# Patient Record
Sex: MALE | Race: BLACK OR AFRICAN AMERICAN | NOT HISPANIC OR LATINO | URBAN - METROPOLITAN AREA
[De-identification: names, ages, dates, MRNs, and addresses within clinical notes are randomized per-mention and may not be internally consistent; named-entity substitution may affect disease eponyms.]

---

## 2019-06-20 ENCOUNTER — HOSPITAL ENCOUNTER (OUTPATIENT)
Dept: PSYCHIATRY | Facility: HOSPITAL | Age: 24
Discharge: HOME | End: 2019-06-20
Attending: PSYCHIATRY & NEUROLOGY
Payer: COMMERCIAL

## 2019-06-20 VITALS
RESPIRATION RATE: 16 BRPM | SYSTOLIC BLOOD PRESSURE: 118 MMHG | BODY MASS INDEX: 26.66 KG/M2 | HEART RATE: 80 BPM | TEMPERATURE: 97 F | HEIGHT: 73 IN | WEIGHT: 201.13 LBS | DIASTOLIC BLOOD PRESSURE: 76 MMHG

## 2019-06-20 DIAGNOSIS — F12.10 CANNABIS ABUSE: ICD-10-CM

## 2019-06-20 DIAGNOSIS — F19.10 SYNTHETIC CANNABINOID ABUSE: Primary | ICD-10-CM

## 2019-06-20 LAB
AMPHET+METHAMPHET UR QL: NEGATIVE
BARBITURATES UR QL SCN>200 NG/ML: NEGATIVE
BENZODIAZ UR QL SCN>200 NG/ML: NEGATIVE
BREATH ALCOHOL: 0
BZE UR QL SCN: NEGATIVE
CANNABINOIDS UR QL SCN: NORMAL
CREAT UR-MCNC: 192 MG/DL (ref 23–375)
ETHANOL UR-MCNC: <10 MG/DL
METHADONE UR QL SCN>300 NG/ML: NEGATIVE
OPIATES UR QL SCN: NEGATIVE
PCP UR QL SCN>25 NG/ML: NEGATIVE
TOXICOLOGY INFORMATION: NORMAL

## 2019-06-20 PROCEDURE — 90853 GROUP PSYCHOTHERAPY: CPT | Mod: ,,, | Performed by: PSYCHOLOGIST

## 2019-06-20 PROCEDURE — 99222 1ST HOSP IP/OBS MODERATE 55: CPT | Mod: ,,, | Performed by: PSYCHIATRY & NEUROLOGY

## 2019-06-20 PROCEDURE — 90853 PR GROUP PSYCHOTHERAPY: ICD-10-PCS | Mod: ,,, | Performed by: PSYCHOLOGIST

## 2019-06-20 PROCEDURE — 30000890 MAYO MISCELLANEOUS TEST (REFLEX)

## 2019-06-20 PROCEDURE — 90853 GROUP PSYCHOTHERAPY: CPT | Performed by: SOCIAL WORKER

## 2019-06-20 PROCEDURE — 80307 DRUG TEST PRSMV CHEM ANLYZR: CPT

## 2019-06-20 PROCEDURE — 80349 CANNABINOIDS NATURAL: CPT

## 2019-06-20 PROCEDURE — 90853 GROUP PSYCHOTHERAPY: CPT

## 2019-06-20 PROCEDURE — 99222 PR INITIAL HOSPITAL CARE,LEVL II: ICD-10-PCS | Mod: ,,, | Performed by: PSYCHIATRY & NEUROLOGY

## 2019-06-20 NOTE — PLAN OF CARE
06/20/19 1000   Activity/Group Therapy Checklist   Group Addiction Education  (family roles )   Attendance Attended   Follows Direction Followed directions   Group Interactions/Observations Interacted appropriately   Affect/Mood Range Normal range   Affect/Mood Display Appropriate

## 2019-06-20 NOTE — PROGRESS NOTES
Group Psychotherapy (PhD/LCSW)    Site: [facility]    Clinical status of patient: Intensive Outpatient Program (IOP)    Date: 6/20/2019    Group Focus: DBT-Based Group Psychotherapy    Length of service: 94020 - 45-50 minutes    Number of patients in attendance: 11    Referred by: Addictive Behavior Unit Treatment Team    Target symptoms: Substance Abuse    Patient's response to treatment: Active Listening and Self-disclosure    Progress toward goals: Progressing adequately    Interval History: Session focus was Emotion Regulation:  Problem-Solving.  Patients were encouraged to identify problems, check the facts, generate solutions and look at pros/cons, and create action steps to use the solution.    Diagnosis: Cannabis use disorder    Plan: Continue treatment on ABU

## 2019-06-20 NOTE — PROGRESS NOTES
Group Psychotherapy (PhD/LCSW)    Site: [facility]    Clinical status of patient: Intensive Outpatient Program (IOP)    Date: 6/20/2019    Group Focus: Psychodynamic Group Psychotherapy    Length of service: 70569 - 45-50 minutes    Number of patients in attendance: 5    Referred by: Addictive Behavior Unit Treatment Team    Target symptoms: Substance Abuse    Patient's response to treatment: Active Listening and Self-disclosure    Progress toward goals: Progressing adequately    Interval History: Shared his story with the group.    Diagnosis: synthetic cannabinoid abuse    Plan: Continue treatment on ABU

## 2019-06-20 NOTE — PLAN OF CARE
06/20/19 1400   Activity/Group Therapy Checklist   Group Addiction Education  (Defense Mechanisms)   Attendance Attended   Follows Direction Followed directions   Group Interactions/Observations Interacted appropriately   Affect/Mood Range Normal range   Affect/Mood Display Appropriate   Goal Progression Progressing

## 2019-06-20 NOTE — H&P
"6/20/2019 9:20 AM  Rosendo Riojas Jr.  1995  [id number]    Addictive Behavior Unit Intensive Outpatient Program Admission History & Physical    STATUS:  Intensive Outpatient Program (IOP)    SUBJECTIVE:   Chief Complaint: "I was using a lot of drugs."    History of Present Illness:   Rosendo Riojas Jr. is a 23 y.o. male with no prior psych history, who presented to ABU IOP due to polysubstance use and recent admission to [facility] for substance induced dino . Per review of [facility] d/c summary, pt presented as manic and grandiose in the setting of recent THC, cocaine, and ecstasy use, cleared quickly during admission and was referred to IOP.     Pt reports that he was at [facility] from doing "stupid things," says he was using drugs and alcohol, cocaine, LSD, alcohol, MDMA, cannabis. Had been feeling sick for about a week, throwing up, not thinking clearly. Hospitalized for a few days, says that he is thinking more clearly, feels a lot better physically.     Reports that he has been using for a few years. Started with weed, then tried LSD. Sometimes xanax, sometimes cocaine. Was selling weed for a while but stopped last year. Notes that substance use has interfered with school work and relationships. Has never tried to stop before, though has tried to cut back unsuccessfully. Never been to treatment.     Marijuana: Had slowed down recently, but had been smoking every day. Started smoking at 18. Feels that it "changed the way [he] viewed things. Got in the way of school/work, affected productivity ,caused conflict. Didn't intentionally smoke synthetics, says that they scared him.     Cocaine: Started a few years ago, initially started as something he would do occasionally, once at a festival, here and there, then became a little more frequent, maybe 1x/month. Never tried crack    LSD: First tried sometime last year or the year before. Began microdosing once he realized he was using too much, would use 1-2x/month. Started " "microdosing last year, would use half a tab. Liked the openness it gave him, but found that when he focused on something bad, it would get worse.     MDMA: Favorite, likes the creativity that he gave him a lot of creativity, would use a couple of times a month, combine with acid.     Opioids: Never tried heroin, tried oxycodone once, made him feel very sick.     Xanax: When other people around him had it, every few months, didn't really like it, made him feel apathetic.     Alcohol: First beer at a party in . Last couple of semesters was drinking pretty heavily, vodka, could go through a handle in a few days-weeks. Beer occasionally, when chilling with friends. ETOH would heighten certain emotions, like anger.     Substance Abuse History:  Substance of Choice: Reports MDMA is favorite, but most frequently uses cannabis  Substances Used: See HPI  History of IVDU?: No  Use of Alcohol: Yes - See HPI. Reports escalating drinking the past year, mainly vodka, sometimes beer.   Average Consumption: "A lot", has a hard time quantifying, though says a handle would last him a couple of weeks.   Last Drink/Use: About a week ago  Tobacco: No  History of Withdrawals: No,   History of Detox: No,   Rehab History: No,   AA/NA: No,   Medication Trials for Substance Abuse: No,   Spouse/Partner Consumption: NA  Patient Aware of Biomedical Complications: Yes    DSM-5 Substance Use Disorder Criteria:  1. Often take in larger amounts or over a longer period of time than was intended: Yes  2. Persistent desire or unsuccessful efforts to cut down or control use:  No  3. Great deal of time spent in activities necessary to obtain substance, use, or recover from effects: Yes  4. Craving/strong desire for substance or urge to use: No  5. Use resulting in failure to fulfill major role obligations at home, work or school: Yes  6. Social, occupational, recreational activities decreased because of use: Yes  7. Continued use despite having " persistent or recurrent social or interpersonal problems cause or exaserbated by the substance: Yes  8. Recurrent use in situations in which it is physically hazardous: Yes  9. Use despite physical or psychological problems that are likely to have been caused or exacerbated by the substance: Yes  10. Tolerance, as defined by either of the following: Yes   A. A need for markedly increased amounts of substance to achieve intoxication or desired effect. -OR-    B. A markedly diminished effect with continued use of the same amount of substance.  11. Withdrawal, as manifested by the following:  No   A. The characteristic withdrawal syndrome for substance. -AND-   B. Substance is taken to relieve or avoid withdrawal symptoms.  Mild (1-3), Moderate (4-5), Severe (?6)      Addictive Behaviors:  Sexual addiction: yes, reports a lot of sex, since getting into college, trouble focusing in school, trouble focusing on reltationships.   Gambling: no  Internet/Social Media/Video Games: no  Shopping: no  Binge eating/Bulimia/Eating Disorders: no  Other behavioral addiction: no    Psychiatric History:  Diagnoses:  Denies.   Previous Medication Trials: no   Previous Psychiatric Hospitalizations: None aside from recent [facility] admission   Previous Suicide Attempts: no   History of Violence: no  Outpatient Psychiatrist: no    Social History:  Marital Status: single  Children: 0   Employment Status: Student  Education: some college, went to [employer] now at [employer] Was initially on medicine track, now wants to do electrical enegineering.   Special Ed: no   History: no  Housing Status: Lives with mom, dad 21 yo brother, 15 yo sister.   Financial Status: Parent support  Leisure/Recreation: Sports/exercise, music,   Childhood History: Reports childhood was good, parents were involved, kept them away from things that wouldn't benefit them.   History of Physical/Sexual Abuse: no  Access to Gun: Dad has a gun, it is locked    Legal  "History:  Past Charges/Incarcerations: no   Pending Charges: no     Family Psychiatric History:   Denies family hx of addiction, depression, bipolar d/o, schizophrenia, etc    Psychiatric Review Of Systems:  sleep: Better. Was having insomnia when using, has improved since hospitalization, getting back on a schedule.   appetite: Improving, less "gluttinous" since not using THC  weight: no  energy/anergy: no  interest/pleasure/anhedonia: no  somatic symptoms: no  guilty/hopelessness: no  concentration: no  S.I.B.s/risky behavior: no  SI/SA:  no    anxiety/panic: yes, reports anxiety when he was selling drugs, worried about getting killed or arrested. Says he doesn't really focus too much on things out of his control, not really a lot of catastrophizing.   Social phobia:  no  OCD:  no  Recurrent nightmares:  yes, Pretty frequently, hard to recall them unless they are vivid. Have improved since he stopped selling drugs.   Other PTSD sx:  no    Irritability: no  Racing thoughts: no  Impulsive behaviors: no  Other hypomanic/manic sx:  no    Paranoia:Mild, some worry about harm related to drug dealing in the past  Delusions: no  AVH:no    Medical Review Of Systems:  Negative     Collateral:   Per [facility] documentation, family reported grandiose, labile, elevated behavior, not sleeping. At baseline works, goes to school, calm.     Allergies:  Patient has no known allergies.  Medical/Surgical History:  Past Medical History:   Diagnosis Date    Cannabis abuse     Synthetic cannabinoid abuse      No past surgical history on file.  OBJECTIVE:     Current Medications:  Infusions:    Scheduled:  Risperidone 0.5mg qhs  PRN:    Home Medications:  Prior to Admission medications    Not on File     Vital Signs:  Vitals:    06/20/19 0855   BP: 118/76   Pulse: 80   Resp: 16   Temp: 97.2 °F (36.2 °C)     Physical Exam:    Head: Normocephalic.   Eyes: Pupils are equal, round, and reactive to light. Conjunctivae and EOM are normal. " "  Cardiovascular: Normal rate and regular rhythm.   Pulmonary/Chest: Effort normal and breath sounds normal. No stridor. No respiratory distress.  Abdominal: Soft. There is no tenderness. There is no guarding.   Musculoskeletal: Normal range of motion. He exhibits no edema.   Neurological: He is alert and oriented to person, place, and time.   Skin: Skin is warm and dry. No rash noted. He is not diaphoretic.       Mental Status Exam:  Appearance: unremarkable, age appropriate, well nourished, casually dressed  Level of Consciousness: Awake, alert  Behavior/Cooperation: normal, cooperative  Psychomotor: unremarkable   Speech: normal tone, normal rate, normal pitch, normal volume  Language: english, fluid  Orientation: grossly intact  Attention Span/Concentration: intact  Memory: Grossly intact  Mood: "pretty good"  Affect: normal and euthymic  Thought Process: linear, normal and logical  Associations: normal and logical  Thought Content: normal, no suicidality, no homicidality, delusions, or paranoia. Possibly mild grandiosity (talks about wanting to be in music business, not sure if this is realistic or not)  Fund of Knowledge: Aware of current events  Abstraction: Grossly intact  Insight: fair  Judgment: fair    Labs/Imaging/Studies:   No results found for this or any previous visit (from the past 24 hour(s)).   ASSESSMENT     Rosendo Riojas Roddy is a 23 y.o. male with no known past psychiatric hx, who presented to ABU IOP following hospitalization at [facility] for dino in the setting of polysubstance use, including cocaine, THC (possibly synthetics), MDMA, LSD. Pt cleared quickly with abstinence and low dose risperidone. Pt reports escalating use over time, interference with school and relationships, use in hazardous situations. No prior addiction treatment, pt presents as motivated to engage in treatment and achieve sobriety at this time, would be at elevated risk of relapse without adequate support and structure. " Still with mild underlying symptoms suggestive of dino (some grandiosity, expansiveness), however significantly improved compared to initial presentation at [facility]. Meets criteria for [facility] level of care.     IMPRESSION  Cannabis Use Disorder Severe  Hallucinogen Use Disorder moderate-severe  Alcohol Use Disorder Moderate  Stimulant Use Disorder Moderate  Substance Induced Mood D/O, r/o bipolar d/o    PLAN     · Start patient on ABU protocol.  · Breathalyzer daily and urine toxicology at least three times per week.  · VS daily x 3 days.  · CBC, CMP, GGT  · Patient counseled on abstinence from drugs, alcohol, and non-prescribed medication    Medications:   · Continue risperidone 0.5mg qhs for now, expect to d/c as symptoms continue to clear.  · Will continue to assess addictive symptoms, including cravings. Can consider naltrexone, acamprosate, or gabapentin for treatment of addiction, though no medications available with evidence base for treatment of cannabis use d/o, stimulant use d/o, or hallucinogen use d/o.     Status: Continue treatment on ABU    This case was discussed with attending psychiatrist MD Raven Ashby MD  [employer] Psychiatry Fellow PGY-7

## 2019-06-21 ENCOUNTER — HOSPITAL ENCOUNTER (OUTPATIENT)
Dept: PSYCHIATRY | Facility: HOSPITAL | Age: 24
Discharge: HOME | End: 2019-06-21
Attending: PSYCHIATRY & NEUROLOGY
Payer: COMMERCIAL

## 2019-06-21 VITALS — DIASTOLIC BLOOD PRESSURE: 77 MMHG | SYSTOLIC BLOOD PRESSURE: 128 MMHG | HEART RATE: 94 BPM | RESPIRATION RATE: 16 BRPM

## 2019-06-21 DIAGNOSIS — F19.10 SYNTHETIC CANNABINOID ABUSE: Primary | ICD-10-CM

## 2019-06-21 DIAGNOSIS — F12.10 CANNABIS ABUSE: ICD-10-CM

## 2019-06-21 LAB
AMPHET+METHAMPHET UR QL: NEGATIVE
BARBITURATES UR QL SCN>200 NG/ML: NEGATIVE
BENZODIAZ UR QL SCN>200 NG/ML: NEGATIVE
BREATH ALCOHOL: 0
BZE UR QL SCN: NEGATIVE
CANNABINOIDS UR QL SCN: NORMAL
CREAT UR-MCNC: 170 MG/DL (ref 23–375)
ETHANOL UR-MCNC: <10 MG/DL
METHADONE UR QL SCN>300 NG/ML: NEGATIVE
OPIATES UR QL SCN: NEGATIVE
PCP UR QL SCN>25 NG/ML: NEGATIVE
TOXICOLOGY INFORMATION: NORMAL

## 2019-06-21 PROCEDURE — 90853 GROUP PSYCHOTHERAPY: CPT | Mod: ,,, | Performed by: PSYCHOLOGIST

## 2019-06-21 PROCEDURE — G0177 OPPS/PHP; TRAIN & EDUC SERV: HCPCS

## 2019-06-21 PROCEDURE — 90853 GROUP PSYCHOTHERAPY: CPT | Performed by: SOCIAL WORKER

## 2019-06-21 PROCEDURE — 90832 PSYTX W PT 30 MINUTES: CPT

## 2019-06-21 PROCEDURE — 90853 PR GROUP PSYCHOTHERAPY: ICD-10-PCS | Mod: ,,, | Performed by: PSYCHOLOGIST

## 2019-06-21 PROCEDURE — 80349 CANNABINOIDS NATURAL: CPT

## 2019-06-21 PROCEDURE — 80307 DRUG TEST PRSMV CHEM ANLYZR: CPT

## 2019-06-21 PROCEDURE — 90853 GROUP PSYCHOTHERAPY: CPT

## 2019-06-21 NOTE — PROGRESS NOTES
Group Psychotherapy (PhD/LCSW)    Site: [facility]    Clinical status of patient: Intensive Outpatient Program (IOP)    Date: 6/21/2019    Group Focus: Stress Management    Length of service: 87330 - 45-50 minutes    Number of patients in attendance: 9    Referred by: Addictive Behavior Unit Treatment Team    Target symptoms: Substance Abuse    Patient's response to treatment: Active Listening    Progress toward goals: Progressing adequately    Interval History: Group learned and practiced mindfulness techniques (progressive muscle relaxation) to improve present-moment awareness, impulsive behavior tendencies, and tolerance of various emotional states.    Diagnosis: Synthetic Cannabinoid Abuse    Plan: Continue treatment on ABU

## 2019-06-21 NOTE — TREATMENT PLAN
[facility]  ADDICTIVE BEHAVIOR UNIT  INTERDISCIPLINARY TREATMENT PLAN  INTENSIVE OUTPATIENT PROGRAM    INTERDISCIPLINARY  TREATMENT TEAM:    Rubin Sainz M.D., Psychiatrist      Mary Delgadillo, Ph.D., Clinical Psychologist    Oumou Kennedy R.N., Registered Nurse    Tobi Murphy, [facility],     Muriel Mcgowan, [facility],     Hai Montoya, [facility],     Resident: Dr. Hansen          Signatures scanned into record separately.      ESTIMATED LOS:  4-6 weeks        The patient has reviewed the treatment plan with staff and has signed the Patient Responsibilities form.  Patient signature scanned into record separately        Dr. Rubin Sainz certifies that the patient would require inpatient psychiatric care if the Partial Hospitalization services were not provided, and services will be furnished under the care of a physician, and under a written Plan of Treatment.    Rubin Sainz M.D., Psychiatrist - Signature scanned into record separately.    TREATMENT PLAN    DIAGNOSIS: Cannabis Use Disorder, severe; Hallucinogen Use Disorder, moderate-severe; Alcohol Use Disorder, moderate; Stimulant Use Disorder, moderate; SIMD      Patient/Family Education Needs/Barriers to Learning (i.e., Language, Reading, Comprehension): None       Support/Advocacy Services/Needs (i.e., Financial, Transportation, Medications): None       Community Resources (i.e., Alcoholics Anonymous, Al Anon, Cocaine Anonymous, Narcotics Anonymous): None           Strengths:  1. Focusing on certain tasks at hand   2. Meeting new people   3. Communication    4. Positive attitude         Limitations:  1. Surroundings   2. Frustration    3. Ease of access   4. Risk for relapse           Goals and Objectives:  1. Goal:  Abstain from alcohol and illicit drugs   Objective measure: Negative breathalyzer, negative urine screens   Time frame to reach goal: By discharge    2  Goal: Attend daily 12-step  meetings   Objective measure: Signed attendance sheet daily   Time frame to reach goal: Each day    3. Goal: Participate in group sessions    Objective measure: Progress notes indicating active listening, self-disclosure,   feedback   Time frame to reach goal: Each day    4. Goal: Obtain a 12-step sponsor   Objective measure: self-report   Time frame to reach goal: By discharge    5. Goal: Complete Life Story   Objective measure: Share story with group   Time frame to reach goal: Within first two weeks of treatment    6. Goal: Complete First Step   Objective measure: Share 1st step with group   Time frame to reach goal:  By discharge    7. Goal: Complete Relapse Prevention Plan   Objective measure: Share plan with group   Time frame to reach goal: By discharge    8.  Goal: Family involvement/participation   Objective measure: Family session documented in progress notes   Time frame to reach goal: By discharge    9. Goal:  Reduce depression   Objective measure: Physician progress note indicating depression is improved   Time frame to reach goal: By discharge    10.  Goal: Reduce anxiety   Objective measure: Physician progress note indicating anxiety is improved   Time frame to reach goal: By discharge                                 Group Interventions:  Psychodynamic Group Psychotherapy  1 hour, five times per week  Goals: 1. Utilize group empathy and support for problem solving; 2. Apply stress management, communication, and assertive skills to personal issues; 3. Discuss negative consequences of addictive behavior; 4. Discuss ways to change lifestyle to support sobriety; 5. Discuss addiction history    Addiction Education Group  1 hour, 2 times per week  Goals:  1. Verbalize increased knowledge of the process of recovery; 2. Understand basic concepts of addiction (denial, powerlessness, unmanageabiltiy, etc.); 3. Develop a consistent, positive image of self    Steps to Recovery Group  1 hour, 1 time per  week  Goals:  1. Learn 12 steps; 2. Identify ways to incorporate 12 step principles into daily life; 3. Complete first step; 4. Verbalize knowledge and understanding of the concept of a higher power    Living Sober Group  1 hour, 2 times per week  Goals:  1.  Reflect upon events of day/weekend, focusing on positive change; 2.  Discuss dynamics of 12 step meetings attended; 3. Discuss topics from book Living Sober     Stress Management Skills Group  1 hour, 3 times per week  Goals: 1. Identify types and levels of stress; 2. Identify and change maladaptive beliefs and behaviors; 3. Identify and practice techniques of stress management    Disease Concept Group  1 hour, 1 time per week  Goals: 1. Verbalize an understanding of the disease concept of addiction; 2. Increase familys understanding of the disease concept of addiction    Communication Skills Group  1 hour, 2 times per week  Goals: 1. Learn rules of effective communication; 2. Improve listening skills; 3. Practice clear communication    Promoting Healthy Lifestyles Group  1 hour, 1 time per week  Goals:  1. Understand the biopsychosocial model of health; 2. Develop insight into how substance abuse/dependency can impact dimensions of health; 3. Develop appropriate health promotion strategies    Relationship Dynamics Group  1 hour, 1 time per week  Goals:  1. Learn about factors that shape relationships; 2. Understand the central role of relationships in personal well-being; 3. Learn how to improve all relationships    Medical Complications Group  1 hour, 1 time per week  Goals:  1.  Increase knowledge of how addiction negatively affects the body; 2. Increase awareness of how abstinence can positively impact health

## 2019-06-21 NOTE — PROGRESS NOTES
Group Psychotherapy (PhD/LCSW)    Site: [facility]    Clinical status of patient: Intensive Outpatient Program (IOP)    Date: 6/21/2019    Group Focus: Psychodynamic Group Psychotherapy    Length of service: 54945 - 45-50 minutes    Number of patients in attendance: 5    Referred by: Addictive Behavior Unit Treatment Team    Target symptoms: Substance Abuse    Patient's response to treatment: Active Listening and Self-disclosure    Progress toward goals: Progressing adequately    Interval History: Pt discussed learning at his first AA meeting that he is not alone and not unique in his addiction.     Diagnosis: synthetic cannabinoid abuse    Plan: Continue treatment on ABU
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
## 2019-06-21 NOTE — PLAN OF CARE
06/21/19 1400   Activity/Group Therapy Checklist   Group Relapse Prevention   Attendance Attended   Follows Direction Followed directions   Group Interactions/Observations Interacted appropriately   Affect/Mood Range Normal range   Affect/Mood Display Appropriate   Goal Progression Progressing

## 2019-06-24 ENCOUNTER — HOSPITAL ENCOUNTER (OUTPATIENT)
Dept: PSYCHIATRY | Facility: HOSPITAL | Age: 24
Discharge: HOME | End: 2019-06-24
Attending: PSYCHIATRY & NEUROLOGY
Payer: COMMERCIAL

## 2019-06-24 VITALS — DIASTOLIC BLOOD PRESSURE: 61 MMHG | HEART RATE: 83 BPM | SYSTOLIC BLOOD PRESSURE: 121 MMHG | RESPIRATION RATE: 16 BRPM

## 2019-06-24 DIAGNOSIS — F12.10 CANNABIS ABUSE: ICD-10-CM

## 2019-06-24 DIAGNOSIS — F19.10 SYNTHETIC CANNABINOID ABUSE: Primary | ICD-10-CM

## 2019-06-24 LAB
AMPHET+METHAMPHET UR QL: NEGATIVE
BARBITURATES UR QL SCN>200 NG/ML: NEGATIVE
BENZODIAZ UR QL SCN>200 NG/ML: NEGATIVE
BREATH ALCOHOL: 0
BZE UR QL SCN: NEGATIVE
CANNABINOIDS UR QL SCN: NORMAL
CREAT UR-MCNC: 129 MG/DL (ref 23–375)
ETHANOL UR-MCNC: <10 MG/DL
METHADONE UR QL SCN>300 NG/ML: NEGATIVE
OPIATES UR QL SCN: NEGATIVE
PCP UR QL SCN>25 NG/ML: NEGATIVE
THC CONFIRMATION CHAIN OF CUSTODY: NORMAL
THC UR-MCNC: 65 NG/ML
TOXICOLOGIST REVIEW: NORMAL
TOXICOLOGY INFORMATION: NORMAL

## 2019-06-24 PROCEDURE — 80307 DRUG TEST PRSMV CHEM ANLYZR: CPT

## 2019-06-24 PROCEDURE — 90853 GROUP PSYCHOTHERAPY: CPT

## 2019-06-24 PROCEDURE — 80349 CANNABINOIDS NATURAL: CPT

## 2019-06-24 PROCEDURE — 90853 GROUP PSYCHOTHERAPY: CPT | Performed by: SOCIAL WORKER

## 2019-06-24 PROCEDURE — 90853 PR GROUP PSYCHOTHERAPY: ICD-10-PCS | Mod: 59,,, | Performed by: PSYCHOLOGIST

## 2019-06-24 PROCEDURE — 90853 GROUP PSYCHOTHERAPY: CPT | Mod: 59,,, | Performed by: PSYCHOLOGIST

## 2019-06-24 NOTE — PLAN OF CARE
06/24/19 1000   Activity/Group Therapy Checklist   Group Educational  (grief/loss )   Attendance Attended   Follows Direction Followed directions   Group Interactions/Observations Interacted appropriately  (laughing to himself )   Affect/Mood Range Normal range   Affect/Mood Display Appropriate

## 2019-06-24 NOTE — PROGRESS NOTES
Group Psychotherapy (PhD/LCSW)    Site: [facility]    Clinical status of patient: Intensive Outpatient Program (IOP)    Date: 6/24/2019    Group Focus: Communication Skills     Length of service: 86849 - 45-50 minutes    Number of patients in attendance: 11    Referred by: Addictive Behavior Unit Treatment Team    Target symptoms: Substance Abuse    Patient's response to treatment: Active Listening      Progress toward goals: Progressing adequately    Interval History:  Discussed how to use I-messages to enhance the communication process. Illustrated their value and their application through modeling; role play with pt's; and vignettes..        Diagnosis: synthetic cannabinoid abuse    Plan: Continue treatment on ABU

## 2019-06-24 NOTE — PROGRESS NOTES
"2019 10:22 AM  Name: Rosendo Riojas Jr.  : 1995  Start Date: 19    ABU Intensive Outpatient Program   Progress Note    Status: Intensive Outpatient Program (IOP)    HPI:  Rosendo Riojas Jr. is a 23 y.o. male with no prior psych history, who presented to ABU IOP due to polysubstance use and recent admission to [facility] for substance induced dino . Per review of [facility] d/c summary, pt presented as manic and grandiose in the setting of recent THC, cocaine, and ecstasy use, cleared quickly during admission and was referred to IOP.      Pt reports that he was at [facility] from doing "stupid things," says he was using drugs and alcohol, cocaine, LSD, alcohol, MDMA, cannabis. Had been feeling sick for about a week, throwing up, not thinking clearly. Hospitalized for a few days, says that he is thinking more clearly, feels a lot better physically.      Reports that he has been using for a few years. Started with weed, then tried LSD. Sometimes xanax, sometimes cocaine. Was selling weed for a while but stopped last year. Notes that substance use has interfered with school work and relationships. Has never tried to stop before, though has tried to cut back unsuccessfully. Never been to treatment.      Marijuana: Had slowed down recently, but had been smoking every day. Started smoking at 18. Feels that it "changed the way [he] viewed things. Got in the way of school/work, affected productivity ,caused conflict. Didn't intentionally smoke synthetics, says that they scared him.      Cocaine: Started a few years ago, initially started as something he would do occasionally, once at a festival, here and there, then became a little more frequent, maybe 1x/month. Never tried crack     LSD: First tried sometime last year or the year before. Began microdosing once he realized he was using too much, would use 1-2x/month. Started microdosing last year, would use half a tab. Liked the openness it gave him, but found that when he " "focused on something bad, it would get worse.      MDMA: Favorite, likes the creativity that he gave him a lot of creativity, would use a couple of times a month, combine with acid.      Opioids: Never tried heroin, tried oxycodone once, made him feel very sick.      Xanax: When other people around him had it, every few months, didn't really like it, made him feel apathetic.      Alcohol: First beer at a party in . Last couple of semesters was drinking pretty heavily, vodka, could go through a handle in a few days-weeks. Beer occasionally, when chilling with friends. ETOH would heighten certain emotions, like anger.        SUBJECTIVE:     Interval Hx:  6/24/19  Pt reports doing pretty well. Had a good weekend, got good sleep. Spent time with family. Denies cravings, said he felt like he got a "natrual high' off doing "normal things" with his family. Sleeping well, going to bed around 10pm. Mood pretty good. Denies use. Going to meetings. Has not started back on risperdal, mom hasn't picked it up, unsure if he needs it.     Toxicology Screen: UDS 6/21 +THC, confirmation pending, Breath ETOH 6/21 Neg   Medication Side Effects: None  Cravings: no  No other acute psychiatric issues reported at this time.    Scheduled Meds:   No current outpatient medications on file prior to encounter.     No current facility-administered medications on file prior to encounter.      Allergies:  Patient has no known allergies.    Psychiatric Review Of Systems:  Denies: Awake, alert  Behavior/Cooperation: normal, cooperative  Psychomotor: unremarkable   Speech: normal tone, normal rate, normal pitch, normal volume  Language: english, fluid  Orientation: grossly intact  Attention Span/Concentration: intact  Memory: Grossly intact  Mood: "Pretty good"  Affect: normal  Thought Process: linear, normal and logical  Associations: normal and logical  Thought Content: normal, no suicidality, no homicidality, delusions, or paranoia  Fund of " Knowledge: Aware of current events  Abstraction: Grossly intact  Insight: fair  Judgment: fair    Laboratory:  Recent Results (from the past 168 hour(s))   Toxicology screen, urine    Collection Time: 06/20/19 11:22 AM   Result Value Ref Range    Alcohol, Urine <10 <10 mg/dL    Benzodiazepines Negative     Methadone metabolites Negative     Cocaine (Metab.) Negative     Opiate Scrn, Ur Negative     Barbiturate Screen, Ur Negative     Amphetamine Screen, Ur Negative     THC Presumptive Positive     Phencyclidine Negative     Creatinine, Random Ur 192.0 23.0 - 375.0 mg/dL    Toxicology Information SEE COMMENT    POCT BREATH ALCOHOL TEST    Collection Time: 06/20/19 11:22 AM   Result Value Ref Range    Breath Alcohol 0.000    Toxicology screen, urine    Collection Time: 06/21/19 10:04 AM   Result Value Ref Range    Alcohol, Urine <10 <10 mg/dL    Benzodiazepines Negative     Methadone metabolites Negative     Cocaine (Metab.) Negative     Opiate Scrn, Ur Negative     Barbiturate Screen, Ur Negative     Amphetamine Screen, Ur Negative     THC Presumptive Positive     Phencyclidine Negative     Creatinine, Random Ur 170.0 23.0 - 375.0 mg/dL    Toxicology Information SEE COMMENT    POCT BREATH ALCOHOL TEST    Collection Time: 06/21/19 10:05 AM   Result Value Ref Range    Breath Alcohol 0.000      ASSESSMENT:     Rosendo Riojas  is a 23 y.o. male with no known past psychiatric hx, who presented to ABU IOP on 6/20/19  following hospitalization at [facility] for dino in the setting of polysubstance use, including cocaine, THC (possibly synthetics), MDMA, LSD. Pt cleared quickly with abstinence and low dose risperidone. Pt reports escalating use over time, interference with school and relationships, use in hazardous situations. No prior addiction treatment, pt presents as motivated to engage in treatment and achieve sobriety at this time, would be at elevated risk of relapse without adequate support and structure. Still with  mild underlying symptoms suggestive of dino (some grandiosity, expansiveness), however significantly improved compared to initial presentation at [facility]. Meets criteria for [facility] level of care.      IMPRESSION  Cannabis Use Disorder Severe  Hallucinogen Use Disorder moderate-severe  Alcohol Use Disorder Moderate  Stimulant Use Disorder Moderate  Substance Induced Mood D/O, r/o bipolar d/o    PLAN:     · Cont patient on ABU protocol.  · Breathalyzer daily and urine toxicology at least three times per week.  · VS daily x 3 days.  · Patient counseled on abstinence from drugs, alcohol, and non-prescribed medication     Medications:   · Continue risperidone 0.5mg qhs for now, expect to d/c as symptoms continue to clear.  · Will continue to assess addictive symptoms, including cravings. Can consider naltrexone, acamprosate, or gabapentin for treatment of addiction, though no medications available with evidence base for treatment of cannabis use d/o, stimulant use d/o, or hallucinogen use d/o.      Status: Continue treatment on ABU     This case was discussed with attending psychiatrist MD Raven Hargrove MD  [employer] Psychiatry Fellow PGY-7

## 2019-06-24 NOTE — PLAN OF CARE
06/24/19 1400   Activity/Group Therapy Checklist   Group Educational  (Gratitude)   Attendance Attended   Follows Direction Followed directions   Group Interactions/Observations Interacted appropriately;Sharing;Alert  (Superficial in responses)   Affect/Mood Range Normal range   Affect/Mood Display Appropriate   Goal Progression Progressing

## 2019-06-24 NOTE — PROGRESS NOTES
Group Psychotherapy (PhD/LCSW)    Site: [facility]    Clinical status of patient: Intensive Outpatient Program (IOP)    Date: 6/24/2019    Group Focus: Psychodynamic Group Psychotherapy    Length of service: 49764 - 45-50 minutes    Number of patients in attendance: 6    Referred by: Addictive Behavior Unit Treatment Team    Target symptoms: Substance Abuse    Patient's response to treatment: Active Listening and Self-disclosure    Progress toward goals: Progressing adequately    Interval History:  Pt shared his story with a new group member.      Diagnosis: synthetic cannabinoid abuse    Plan: Continue treatment on ABU

## 2019-06-25 ENCOUNTER — HOSPITAL ENCOUNTER (OUTPATIENT)
Dept: PSYCHIATRY | Facility: HOSPITAL | Age: 24
Discharge: HOME | End: 2019-06-25
Attending: PSYCHIATRY & NEUROLOGY
Payer: COMMERCIAL

## 2019-06-25 DIAGNOSIS — F19.10 SYNTHETIC CANNABINOID ABUSE: Primary | ICD-10-CM

## 2019-06-25 PROCEDURE — 90853 GROUP PSYCHOTHERAPY: CPT | Performed by: SOCIAL WORKER

## 2019-06-25 PROCEDURE — 90853 PR GROUP PSYCHOTHERAPY: ICD-10-PCS | Mod: ,,, | Performed by: PSYCHOLOGIST

## 2019-06-25 PROCEDURE — 90846 PR FAMILY PSYCHOTHERAPY W/O PT, 50 MIN: ICD-10-PCS | Mod: ,,, | Performed by: PSYCHIATRY & NEUROLOGY

## 2019-06-25 PROCEDURE — 90846 FAMILY PSYTX W/O PT 50 MIN: CPT | Mod: ,,, | Performed by: PSYCHIATRY & NEUROLOGY

## 2019-06-25 PROCEDURE — 90853 GROUP PSYCHOTHERAPY: CPT | Mod: ,,, | Performed by: PSYCHOLOGIST

## 2019-06-25 NOTE — PLAN OF CARE
06/25/19 1400   Activity/Group Therapy Checklist   Group Relapse Prevention   Attendance Attended   Follows Direction Followed directions   Group Interactions/Observations Interacted appropriately   Affect/Mood Range Normal range   Affect/Mood Display Appropriate   Goal Progression Progressing

## 2019-06-25 NOTE — PROGRESS NOTES
Group Psychotherapy (PhD/LCSW)    Site: [facility]    Clinical status of patient: Intensive Outpatient Program (IOP)    Date: 6/25/2019    Group Focus: DBT-Based Group Psychotherapy    Length of service: 37302 - 45-50 minutes    Number of patients in attendance: 11    Referred by: Addictive Behavior Unit Treatment Team    Target symptoms: Substance Abuse    Patient's response to treatment: Active Listening and Self-disclosure    Progress toward goals: Progressing adequately    Interval History: Session focus was Distress Tolerance:  STOP and Self-Soothe.  Patients were encouraged to use crisis survival skills to reduce intensity of distress.  Each patient identified something soothing for all five senses.    Diagnosis: Synthetic cannabinoid abuse    Plan: Continue treatment on ABU

## 2019-06-25 NOTE — PROGRESS NOTES
Group Psychotherapy (PhD/LCSW)    Site: [facility]    Clinical status of patient: Intensive Outpatient Program (IOP)    Date: 6/25/2019    Group Focus: Psychodynamic Group Psychotherapy    Length of service: 56260 - 45-50 minutes    Number of patients in attendance: 7    Referred by: Addictive Behavior Unit Treatment Team    Target symptoms: Substance Abuse    Patient's response to treatment: Active Listening and Self-disclosure    Progress toward goals: Progressing adequately    Interval History: Shared his story with new group member.    Diagnosis: synthetic cannabinoid abuse    Plan: Continue treatment on ABU

## 2019-06-25 NOTE — PROGRESS NOTES
"Family Meeting Note    Met with Rosendo's mother and aunt. They report that Rosendo is doing "much better" than prior to his hospitalization at [facility]. Both described him as grandiose, speaking rapidly, delusional, prior to hospital stay. Mom reports that a couple of weeks prior to inpatient stay, he had gotten a physical illness (vomitting, loss of appetite) that she now believes is related to his drug use. Says he started feeling better, and going out, would come home at 1-2am, be very loud, which is unlike him. Mom and aunt say that, at baseline, he is mild-mannered, low-key, polite. Mom had no concerns about him prior to his becoming ill a couple weeks ago.     Discussed process of program and expectations. Currently oRsendo does not have access to car or cell phone, mom plans to gradually increase privleges as he gets time away from drugs. Discussed positive lifestyle changes, and risk of cannabinoid use in the developing brain.     Plan:  - Continue ABU, monitor symptoms      Raven Varela MD  \[facility]\"" Addiction Psychiatry Fellow PGY-7      Staff note : I, Rubin Sainz MD have personally seen and met with Dr Varela and Rosendo's mother and aunt.   "

## 2019-06-25 NOTE — PROGRESS NOTES
Group Psychotherapy (PhD/LCSW)    Site: [facility]    Clinical status of patient: Intensive Outpatient Program (IOP)    Date: 6/25/2019    Group Focus: Disease Model of Addiction      Length of service: 70077 - 45-50 minutes    Number of patients in attendance: 7    Referred by: Addictive Behavior Unit Treatment Team    Target symptoms: Substance Abuse    Patient's response to treatment: Active Listening      Progress toward goals: Progressing adequately    Interval History: Discussed the basic neuropsychological concepts of the Disease Model of Addiction and their relevance to the phenomena of addiction (eg, powerlessness; cravings; euphoric recall; tolerance; etc). Noted the way the Disease Model comports with 12-step practices and the value of understanding the Disease Model for sustaining long-term sobriety.    Diagnosis: Synthetic cannabinoid abuse    Plan: Continue treatment on ABU

## 2019-06-26 ENCOUNTER — HOSPITAL ENCOUNTER (OUTPATIENT)
Dept: PSYCHIATRY | Facility: HOSPITAL | Age: 24
Discharge: HOME | End: 2019-06-26
Attending: PSYCHIATRY & NEUROLOGY
Payer: COMMERCIAL

## 2019-06-26 VITALS — HEART RATE: 93 BPM | RESPIRATION RATE: 16 BRPM | SYSTOLIC BLOOD PRESSURE: 136 MMHG | DIASTOLIC BLOOD PRESSURE: 85 MMHG

## 2019-06-26 DIAGNOSIS — F19.10 SYNTHETIC CANNABINOID ABUSE: Primary | ICD-10-CM

## 2019-06-26 DIAGNOSIS — F12.10 CANNABIS ABUSE: ICD-10-CM

## 2019-06-26 LAB
BREATH ALCOHOL: 0
ETHYL GLUCURONIDE: NEGATIVE

## 2019-06-26 PROCEDURE — 90853 PR GROUP PSYCHOTHERAPY: ICD-10-PCS | Mod: ,,, | Performed by: PSYCHOLOGIST

## 2019-06-26 PROCEDURE — 90853 GROUP PSYCHOTHERAPY: CPT | Mod: ,,, | Performed by: PSYCHOLOGIST

## 2019-06-26 PROCEDURE — 99232 SBSQ HOSP IP/OBS MODERATE 35: CPT | Mod: ,,, | Performed by: PSYCHIATRY & NEUROLOGY

## 2019-06-26 PROCEDURE — 99232 PR SUBSEQUENT HOSPITAL CARE,LEVL II: ICD-10-PCS | Mod: ,,, | Performed by: PSYCHIATRY & NEUROLOGY

## 2019-06-26 PROCEDURE — 90853 GROUP PSYCHOTHERAPY: CPT | Performed by: SOCIAL WORKER

## 2019-06-26 PROCEDURE — 80307 DRUG TEST PRSMV CHEM ANLYZR: CPT

## 2019-06-26 PROCEDURE — 90853 GROUP PSYCHOTHERAPY: CPT

## 2019-06-26 NOTE — PROGRESS NOTES
Group Psychotherapy (PhD/LCSW)    Site: [facility]    Clinical status of patient: Intensive Outpatient Program (IOP)    Date: 6/26/2019    Group Focus: DBT-Based Group Psychotherapy    Length of service: 54575 - 45-50 minutes    Number of patients in attendance: 10    Referred by: Addictive Behavior Unit Treatment Team    Target symptoms: Substance Abuse    Patient's response to treatment: Active Listening and Self-disclosure    Progress toward goals: Progressing adequately    Interval History: Session focus was Distress Tolerance:  TIP skill.  Patients were encouraged to use temperature, intense exercise, paced breathing, or paired muscle relaxation to reduce intensity of distress.    Diagnosis: Synthetic cannabinoid abuse    Plan: Continue treatment on ABU

## 2019-06-26 NOTE — PLAN OF CARE
06/26/19 1400   Activity/Group Therapy Checklist   Group Music   Attendance Attended   Follows Direction Followed directions   Group Interactions/Observations Interacted appropriately   Affect/Mood Range Normal range   Affect/Mood Display Appropriate   Goal Progression Progressing

## 2019-06-26 NOTE — PROGRESS NOTES
Group Psychotherapy (PhD/LCSW)    Site: [facility]    Clinical status of patient: Intensive Outpatient Program (IOP)    Date: 6/26/2019    Group Focus: Psychodynamic Group Psychotherapy    Length of service: 74056 - 45-50 minutes    Number of patients in attendance: 7    Referred by: Addictive Behavior Unit Treatment Team    Target symptoms: Substance Abuse    Patient's response to treatment: Active Listening and Self-disclosure    Progress toward goals: Progressing adequately    Interval History: reports he is always tired despite sleeping soundly at night, napping during the day, and exercising daily.    Diagnosis: synthetic cannabinoid abuse    Plan: Continue treatment on ABU

## 2019-06-26 NOTE — PROGRESS NOTES
"2019 10:22 AM  Name: Rosendo Riojas Jr.  : 1995  Start Date: 19    ABU Intensive Outpatient Program   Progress Note    Status: Intensive Outpatient Program (IOP)    HPI:  Rosendo Riojas Jr. is a 23 y.o. male with no prior psych history, who presented to ABU IOP due to polysubstance use and recent admission to [facility] for substance induced dino . Per review of [facility] d/c summary, pt presented as manic and grandiose in the setting of recent THC, cocaine, and ecstasy use, cleared quickly during admission and was referred to IOP.      Pt reports that he was at [facility] from doing "stupid things," says he was using drugs and alcohol, cocaine, LSD, alcohol, MDMA, cannabis. Had been feeling sick for about a week, throwing up, not thinking clearly. Hospitalized for a few days, says that he is thinking more clearly, feels a lot better physically.      Reports that he has been using for a few years. Started with weed, then tried LSD. Sometimes xanax, sometimes cocaine. Was selling weed for a while but stopped last year. Notes that substance use has interfered with school work and relationships. Has never tried to stop before, though has tried to cut back unsuccessfully. Never been to treatment.      Marijuana: Had slowed down recently, but had been smoking every day. Started smoking at 18. Feels that it "changed the way [he] viewed things. Got in the way of school/work, affected productivity ,caused conflict. Didn't intentionally smoke synthetics, says that they scared him.      Cocaine: Started a few years ago, initially started as something he would do occasionally, once at a festival, here and there, then became a little more frequent, maybe 1x/month. Never tried crack     LSD: First tried sometime last year or the year before. Began microdosing once he realized he was using too much, would use 1-2x/month. Started microdosing last year, would use half a tab. Liked the openness it gave him, but found that when he " "focused on something bad, it would get worse.      MDMA: Favorite, likes the creativity that he gave him a lot of creativity, would use a couple of times a month, combine with acid.      Opioids: Never tried heroin, tried oxycodone once, made him feel very sick.      Xanax: When other people around him had it, every few months, didn't really like it, made him feel apathetic.      Alcohol: First beer at a party in . Last couple of semesters was drinking pretty heavily, vodka, could go through a handle in a few days-weeks. Beer occasionally, when chilling with friends. ETOH would heighten certain emotions, like anger.        SUBJECTIVE:     Interval Hx:  6/24/19  Pt reports doing pretty well. Had a good weekend, got good sleep. Spent time with family. Denies cravings, said he felt like he got a "natrual high' off doing "normal things" with his family. Sleeping well, going to bed around 10pm. Mood pretty good. Denies use. Going to meetings. Has not started back on risperdal, mom hasn't picked it up, unsure if he needs it.     Toxicology Screen: UDS 6/21 +THC, confirmation pending, Breath ETOH 6/21 Neg   Medication Side Effects: None  Cravings: no  No other acute psychiatric issues reported at this time.    6/26/19  Pt reports doing pretty well. Mood has been good, does feel bored at times which will lead to feeling frustrated, reports that when he feels this way, will work on art, listen to music, exercise, and that these things help him a lot. Groups have been helpful, does express concern about another peer who has been bringing up self-injury in groups, Ro finds this distressing. Otherwise finds program helpful.  [place] [facility] [place]. Denies use or cravings. Reports thinking feels clear again. Sleeping well, bed around 11-12, sleeps through the night. Eating well. No SI. Did not restart risperdal since hospitalization, not feeling he needs it (consistent with family report yesterday). " "No other concerns.     Toxicology Screen: UDS 6/24 +THC, 6/21 + THC confirmation pending, 6/20 + THC  Level 64 Breath ETOH 6/26, 6/24 Neg, ETOH Biomarkers 6/24: Neg  Medication Side Effects: None  Cravings: no  No other acute psychiatric issues reported at this time.    Scheduled Meds:   No current outpatient medications on file prior to encounter.     No current facility-administered medications on file prior to encounter.      Allergies:  Patient has no known allergies.    Psychiatric Review Of Systems:  Denies: Awake, alert  Behavior/Cooperation: normal, cooperative  Psychomotor: unremarkable   Speech: normal tone, normal rate, normal pitch, normal volume  Language: english, fluid  Orientation: grossly intact  Attention Span/Concentration: intact  Memory: Grossly intact  Mood: "Pretty good"  Affect: normal, calm  Thought Process: linear, normal and logical  Associations: normal and logical  Thought Content: normal, no suicidality, no homicidality, delusions, or paranoia  Fund of Knowledge: Aware of current events  Abstraction: Grossly intact  Insight: fair  Judgment: Intact    Laboratory:  Recent Results (from the past 168 hour(s))   Toxicology screen, urine    Collection Time: 06/20/19 11:22 AM   Result Value Ref Range    Alcohol, Urine <10 <10 mg/dL    Benzodiazepines Negative     Methadone metabolites Negative     Cocaine (Metab.) Negative     Opiate Scrn, Ur Negative     Barbiturate Screen, Ur Negative     Amphetamine Screen, Ur Negative     THC Presumptive Positive     Phencyclidine Negative     Creatinine, Random Ur 192.0 23.0 - 375.0 mg/dL    Toxicology Information SEE COMMENT    POCT BREATH ALCOHOL TEST    Collection Time: 06/20/19 11:22 AM   Result Value Ref Range    Breath Alcohol 0.000    THC , urine, confirmation    Collection Time: 06/20/19  3:53 PM   Result Value Ref Range    THC Conf Chain of Custody Test Not Performed     THC GC/MS Conf 65 Cutoff: 3.0 ng/mL    THC Conf. Interp. Positive.  "   Toxicology screen, urine    Collection Time: 06/21/19 10:04 AM   Result Value Ref Range    Alcohol, Urine <10 <10 mg/dL    Benzodiazepines Negative     Methadone metabolites Negative     Cocaine (Metab.) Negative     Opiate Scrn, Ur Negative     Barbiturate Screen, Ur Negative     Amphetamine Screen, Ur Negative     THC Presumptive Positive     Phencyclidine Negative     Creatinine, Random Ur 170.0 23.0 - 375.0 mg/dL    Toxicology Information SEE COMMENT    POCT BREATH ALCOHOL TEST    Collection Time: 06/21/19 10:05 AM   Result Value Ref Range    Breath Alcohol 0.000    Toxicology screen, urine    Collection Time: 06/24/19 11:48 AM   Result Value Ref Range    Alcohol, Urine <10 <10 mg/dL    Benzodiazepines Negative     Methadone metabolites Negative     Cocaine (Metab.) Negative     Opiate Scrn, Ur Negative     Barbiturate Screen, Ur Negative     Amphetamine Screen, Ur Negative     THC Presumptive Positive     Phencyclidine Negative     Creatinine, Random Ur 129.0 23.0 - 375.0 mg/dL    Toxicology Information SEE COMMENT    Alcohol Biomarkers, urine    Collection Time: 06/24/19 11:49 AM   Result Value Ref Range    Ethyl Glucuronide NEGATIVE    POCT BREATH ALCOHOL TEST    Collection Time: 06/24/19 11:50 AM   Result Value Ref Range    Breath Alcohol 0.000    POCT BREATH ALCOHOL TEST    Collection Time: 06/26/19 10:17 AM   Result Value Ref Range    Breath Alcohol 0.000      ASSESSMENT:     Rosendo Riojas  is a 23 y.o. male with no known past psychiatric hx, who presented to ABU IOP on 6/20/19  following hospitalization at [facility] for dino in the setting of polysubstance use, including cocaine, THC (possibly synthetics), MDMA, LSD. Pt cleared quickly with abstinence and low dose risperidone. Pt reports escalating use over time, interference with school and relationships, use in hazardous situations. No prior addiction treatment, pt presents as motivated to engage in treatment and achieve sobriety at this time, would  be at elevated risk of relapse without adequate support and structure. No longer on antipsychotic, presenting without symptoms of psychosis or dino, per family appears to be at baseline. Meets criteria for IOP level of care.      IMPRESSION  Cannabis Use Disorder Severe  Hallucinogen Use Disorder moderate-severe  Alcohol Use Disorder Moderate  Stimulant Use Disorder Moderate  Substance Induced Mood D/O, r/o bipolar d/o    PLAN:     · Cont patient on ABU protocol.  · Breathalyzer daily and urine toxicology at least three times per week. Follow THC levels.   · VS daily x 3 days.  · Patient counseled on abstinence from drugs, alcohol, and non-prescribed medication     Medications:   · Will continue to assess addictive symptoms, including cravings. Can consider naltrexone, acamprosate, or gabapentin for treatment of addiction, though no medications available with evidence base for treatment of cannabis use d/o, stimulant use d/o, or hallucinogen use d/o.   · Hold risperdal, patient has not taken since d/c from [facility]. No overt evidence of psychotic or manic symptoms with abstinence from substances, though will monitor closely and can restart if needed, pt has prescription.      Status: Continue treatment on ABU     This case was discussed with attending psychiatrist MD Raven Castano MD  [employer] Psychiatry Fellow PGY-7      Staff note : I, Rubin Sainz MD have personally seen and evaluated the patient , and reviewed the above history and exam. I agree and concur with the current assessment and plan.

## 2019-06-26 NOTE — PROGRESS NOTES
"Group Psychotherapy (PhD/LCSW)    Site: [facility]    Clinical status of patient: Intensive Outpatient Program (IOP)    Date: 6/26/2019    Group Focus: The Dynamics of Relationship       Length of service: 30244 - 45-50 minutes    Number of patients in attendance: 12    Referred by: Addictive Behavior Unit Treatment Team    Target symptoms: Substance Abuse    Patient's response to treatment: Active Listening      Progress toward goals: Progressing adequately    Interval History: Discussed strategies for ameliorating dysfunctional relationship dynamics by changing one's own part in the pattern rather than focusing on trying to get the other person to change their behavior. Illustrated the way the "butterfly effect" and the "ripple effect" apply to interpersonal relationship dynamics.      Diagnosis: Synthetic cannabinoid abuse    Plan: Continue treatment on ABU        "

## 2019-06-27 ENCOUNTER — HOSPITAL ENCOUNTER (OUTPATIENT)
Dept: PSYCHIATRY | Facility: HOSPITAL | Age: 24
Discharge: HOME | End: 2019-06-27
Attending: PSYCHIATRY & NEUROLOGY
Payer: COMMERCIAL

## 2019-06-27 DIAGNOSIS — F12.10 CANNABIS ABUSE: ICD-10-CM

## 2019-06-27 DIAGNOSIS — F19.10 SYNTHETIC CANNABINOID ABUSE: Primary | ICD-10-CM

## 2019-06-27 LAB
AMPHET+METHAMPHET UR QL: NEGATIVE
BARBITURATES UR QL SCN>200 NG/ML: NEGATIVE
BENZODIAZ UR QL SCN>200 NG/ML: NEGATIVE
BREATH ALCOHOL: 0
BZE UR QL SCN: NEGATIVE
CANNABINOIDS UR QL SCN: NORMAL
CREAT UR-MCNC: 235 MG/DL (ref 23–375)
ETHANOL UR-MCNC: <10 MG/DL
METHADONE UR QL SCN>300 NG/ML: NEGATIVE
OPIATES UR QL SCN: NEGATIVE
PCP UR QL SCN>25 NG/ML: NEGATIVE
THC CONFIRMATION CHAIN OF CUSTODY: NORMAL
THC UR-MCNC: 50 NG/ML
TOXICOLOGIST REVIEW: NORMAL
TOXICOLOGY INFORMATION: NORMAL

## 2019-06-27 PROCEDURE — 90853 GROUP PSYCHOTHERAPY: CPT | Performed by: SOCIAL WORKER

## 2019-06-27 PROCEDURE — 90853 PR GROUP PSYCHOTHERAPY: ICD-10-PCS | Mod: ,,, | Performed by: PSYCHOLOGIST

## 2019-06-27 PROCEDURE — 90853 GROUP PSYCHOTHERAPY: CPT | Mod: ,,, | Performed by: PSYCHOLOGIST

## 2019-06-27 PROCEDURE — 80349 CANNABINOIDS NATURAL: CPT

## 2019-06-27 PROCEDURE — 90853 GROUP PSYCHOTHERAPY: CPT

## 2019-06-27 NOTE — PLAN OF CARE
06/27/19 1400   Activity/Group Therapy Checklist   Group Relapse Prevention  (Step Work )   Attendance Attended   Follows Direction Followed directions   Group Interactions/Observations Interacted appropriately   Affect/Mood Range Normal range   Affect/Mood Display Appropriate   Goal Progression Progressing

## 2019-06-27 NOTE — PROGRESS NOTES
"Group Psychotherapy (PhD/LCSW)    Site: [facility]    Clinical status of patient: Intensive Outpatient Program (IOP)    Date: 6/27/2019    Group Focus: DBT - Distress Tolerance    Length of service: 42077 - 45-50 minutes    Number of patients in attendance: 12    Referred by: Addictive Behavior Unit Treatment Team    Target symptoms: Substance Abuse    Patient's response to treatment: Active Listening    Progress toward goals: Progressing adequately    Interval History: Group learned about ACCEPTS and IMPROVE skills for tolerating distress. They were encouraged to create their own "emergency coping plan" using these and other distress tolerance skills.    Diagnosis: Synthetic Cannabinoid Abuse    Plan: Continue treatment on ABU        "

## 2019-06-27 NOTE — PROGRESS NOTES
Group Psychotherapy (PhD/LCSW)    Site: [facility]    Clinical status of patient: Intensive Outpatient Program (IOP)    Date: 6/27/2019    Group Focus: Psychodynamic Group Psychotherapy    Length of service: 38932 - 45-50 minutes    Number of patients in attendance: 7    Referred by: Addictive Behavior Unit Treatment Team    Target symptoms: Substance Abuse    Patient's response to treatment: Active Listening and Self-disclosure    Progress toward goals: Progressing adequately    Interval History: Discussed the importance of patience in the recovery process and strategies for cultivating same. Pt described himself as an impatient person.    Diagnosis: synthetic cannabinoid abuse    Plan: Continue treatment on ABU

## 2019-06-27 NOTE — PATIENT CARE CONFERENCE
ABU Staffing:    Cannabis Use Disorder Severe  Hallucinogen Use Disorder moderate-severe  Alcohol Use Disorder Moderate  Stimulant Use Disorder Moderate  Substance Induced Mood D/O, r/o bipolar d/o              1. Pt is attending all groups    2. Pt is attending all meetings  3. Pt 's has minimally supportive family  4. Pt has completed spiritual assessment    5. Pt will present life story    6. Pt will present Step One assignment    7. Pt is exploring issues related to relapse  prevention; spirituality; stress management; improved communication skills; assertiveness training; poor self-esteem; disease concepts; cross addictions; and, work related issues    8. D/C date: TBD     Staff discussed pt enrolling in ABU due to polysubstance use. Staffing discussed pt's mother and aunt attending family day and appearing supportive of pt. Staffing discussed pt's positive attitude towards recovery and engaging in program and AA appropriately. Staffing discussed pt discontinuing off of Risperdal.       Problem: Cannabis Use Disorder Severe  Goal: Address in 12 step meetings and group and individual sessions    Objective Measure: participation in groups, self report, length of sobriety, and relapse prevention plan  Time: Prior to discharge    Progress: Pt is attending groups and sessions       Problem: Hallucinogen Use Disorder moderate-severe  Goal: Address in 12 step meetings and group and individual sessions    Objective Measure: participation in groups, self report, length of sobriety, and relapse prevention plan  Time: Prior to discharge    Progress: Pt is attending groups and sessions       Problem: Alcohol Use Disorder Moderate  Goal: Address in 12 step meetings and group and individual sessions    Objective Measure: participation in groups, self report, length of sobriety, and relapse prevention plan  Time: Prior to discharge    Progress: Pt is attending groups and sessions       Problem: Stimulant Use Disorder  Moderate  Goal: Address in 12 step meetings and group and individual sessions    Objective Measure: participation in groups, self report, length of sobriety, and relapse prevention plan  Time: Prior to discharge    Progress: Pt is attending groups and sessions       Problem: Substance Induced Mood D/O, r/o bipolar d/o  Goal: Address in 12 step meetings and group and individual sessions    Objective Measure: participation in groups, self report, length of sobriety, and relapse prevention plan  Time: Prior to discharge    Progress: Pt is attending groups and sessions          Staff members present:   Dr. Varela, fellow  Dr. Jade MD Resident  Muriel Mcgowan, [facility]  Oumou Kennedy RN

## 2019-06-27 NOTE — PLAN OF CARE
06/27/19 1000   Activity/Group Therapy Checklist   Group Addiction Education  (codependency )   Attendance Attended   Follows Direction Followed directions   Group Interactions/Observations Interacted appropriately   Affect/Mood Range Normal range   Affect/Mood Display Appropriate   Goal Progression Progressing

## 2019-06-28 ENCOUNTER — HOSPITAL ENCOUNTER (OUTPATIENT)
Dept: PSYCHIATRY | Facility: HOSPITAL | Age: 24
Discharge: HOME | End: 2019-06-28
Attending: PSYCHIATRY & NEUROLOGY
Payer: COMMERCIAL

## 2019-06-28 VITALS — HEART RATE: 83 BPM | RESPIRATION RATE: 16 BRPM | SYSTOLIC BLOOD PRESSURE: 112 MMHG | DIASTOLIC BLOOD PRESSURE: 69 MMHG

## 2019-06-28 DIAGNOSIS — F19.10 SYNTHETIC CANNABINOID ABUSE: Primary | ICD-10-CM

## 2019-06-28 DIAGNOSIS — F12.10 CANNABIS ABUSE: ICD-10-CM

## 2019-06-28 LAB
AMPHET+METHAMPHET UR QL: NEGATIVE
BARBITURATES UR QL SCN>200 NG/ML: NEGATIVE
BENZODIAZ UR QL SCN>200 NG/ML: NEGATIVE
BREATH ALCOHOL: 0
BZE UR QL SCN: NEGATIVE
CANNABINOIDS UR QL SCN: NORMAL
CREAT UR-MCNC: 137 MG/DL (ref 23–375)
ETHANOL UR-MCNC: <10 MG/DL
METHADONE UR QL SCN>300 NG/ML: NEGATIVE
OPIATES UR QL SCN: NEGATIVE
PCP UR QL SCN>25 NG/ML: NEGATIVE
THC CONFIRMATION CHAIN OF CUSTODY: NORMAL
THC UR-MCNC: NORMAL NG/ML
TOXICOLOGIST REVIEW: NORMAL
TOXICOLOGY INFORMATION: NORMAL

## 2019-06-28 PROCEDURE — 80307 DRUG TEST PRSMV CHEM ANLYZR: CPT

## 2019-06-28 PROCEDURE — 99232 PR SUBSEQUENT HOSPITAL CARE,LEVL II: ICD-10-PCS | Mod: ,,, | Performed by: PSYCHIATRY & NEUROLOGY

## 2019-06-28 PROCEDURE — 90853 PR GROUP PSYCHOTHERAPY: ICD-10-PCS | Mod: ,,, | Performed by: PSYCHOLOGIST

## 2019-06-28 PROCEDURE — 90853 GROUP PSYCHOTHERAPY: CPT

## 2019-06-28 PROCEDURE — 90853 GROUP PSYCHOTHERAPY: CPT | Mod: ,,, | Performed by: PSYCHOLOGIST

## 2019-06-28 PROCEDURE — 80349 CANNABINOIDS NATURAL: CPT

## 2019-06-28 PROCEDURE — 99232 SBSQ HOSP IP/OBS MODERATE 35: CPT | Mod: ,,, | Performed by: PSYCHIATRY & NEUROLOGY

## 2019-06-28 PROCEDURE — 90853 GROUP PSYCHOTHERAPY: CPT | Performed by: SOCIAL WORKER

## 2019-06-28 NOTE — PLAN OF CARE
06/28/19 1000   Activity/Group Therapy Checklist   Group Goals/Reflection   Attendance Attended   Follows Direction Followed directions   Group Interactions/Observations Interacted appropriately   Affect/Mood Range Normal range   Affect/Mood Display Appropriate   Goal Progression Progressing

## 2019-06-28 NOTE — PROGRESS NOTES
"2019 10:22 AM  Name: Rosendo Riojas Jr.  : 1995  Start Date: 19    [facility]U Intensive Outpatient Program   Progress Note    Status: Intensive Outpatient Program (IOP)    HPI:  Rosendo Riojas Jr. is a 23 y.o. male with no prior psych history, who presented to ABU IOP due to polysubstance use and recent admission to [facility] for substance induced dino . Per review of [facility] d/c summary, pt presented as manic and grandiose in the setting of recent THC, cocaine, and ecstasy use, cleared quickly during admission and was referred to IOP.      Pt reports that he was at [facility] from doing "stupid things," says he was using drugs and alcohol, cocaine, LSD, alcohol, MDMA, cannabis. Had been feeling sick for about a week, throwing up, not thinking clearly. Hospitalized for a few days, says that he is thinking more clearly, feels a lot better physically.      Reports that he has been using for a few years. Started with weed, then tried LSD. Sometimes xanax, sometimes cocaine. Was selling weed for a while but stopped last year. Notes that substance use has interfered with school work and relationships. Has never tried to stop before, though has tried to cut back unsuccessfully. Never been to treatment.      Marijuana: Had slowed down recently, but had been smoking every day. Started smoking at 18. Feels that it "changed the way [he] viewed things. Got in the way of school/work, affected productivity ,caused conflict. Didn't intentionally smoke synthetics, says that they scared him.      Cocaine: Started a few years ago, initially started as something he would do occasionally, once at a festival, here and there, then became a little more frequent, maybe 1x/month. Never tried crack     LSD: First tried sometime last year or the year before. Began microdosing once he realized he was using too much, would use 1-2x/month. Started microdosing last year, would use half a tab. Liked the openness it gave him, but found that when he " "focused on something bad, it would get worse.      MDMA: Favorite, likes the creativity that he gave him a lot of creativity, would use a couple of times a month, combine with acid.      Opioids: Never tried heroin, tried oxycodone once, made him feel very sick.      Xanax: When other people around him had it, every few months, didn't really like it, made him feel apathetic.      Alcohol: First beer at a party in . Last couple of semesters was drinking pretty heavily, vodka, could go through a handle in a few days-weeks. Beer occasionally, when chilling with friends. ETOH would heighten certain emotions, like anger.        SUBJECTIVE:     Interval Hx:  6/24/19  Pt reports doing pretty well. Had a good weekend, got good sleep. Spent time with family. Denies cravings, said he felt like he got a "natrual high' off doing "normal things" with his family. Sleeping well, going to bed around 10pm. Mood pretty good. Denies use. Going to meetings. Has not started back on risperdal, mom hasn't picked it up, unsure if he needs it.     Toxicology Screen: UDS 6/21 +THC, confirmation pending, Breath ETOH 6/21 Neg   Medication Side Effects: None  Cravings: no  No other acute psychiatric issues reported at this time.    6/26/19  Pt reports doing pretty well. Mood has been good, does feel bored at times which will lead to feeling frustrated, reports that when he feels this way, will work on art, listen to music, exercise, and that these things help him a lot. Groups have been helpful, does express concern about another peer who has been bringing up self-injury in groups, Ro finds this distressing. Otherwise finds program helpful.  Going to AA meetings on the [facility], likes them. Denies use or cravings. Reports thinking feels clear again. Sleeping well, bed around 11-12, sleeps through the night. Eating well. No SI. Did not restart risperdal since hospitalization, not feeling he needs it (consistent with family report yesterday). " "No other concerns.     Toxicology Screen: UDS 6/24 +THC, 6/21 + THC confirmation pending, 6/20 + THC  Level 64 Breath ETOH 6/26, 6/24 Neg, ETOH Biomarkers 6/24: Neg  Medication Side Effects: None  Cravings: no  No other acute psychiatric issues reported at this time.    6/27/19  Pt reports doing well. Enjoying groups. Mood has been good, denies any irritability, agitation, or strange experiences. Sleeping well. Denies NM's. Not taking risperdal, hasn't felt he needed it. Going to meetings every day, found a sponsor named Parrish. No cravings. Getting along well with family. Denies any other concerns.     Toxicology Screen: UDS 6/26 +THC 6/24 +THC, 6/21 + THC Level 50 6/20 + THC  Level 64 Breath ETOH 6/26, 6/27 Neg, ETOH Biomarkers 6/24: Neg  Medication Side Effects: None  Cravings: no  No other acute psychiatric issues reported at this time.    Scheduled Meds:   No current outpatient medications on file prior to encounter.     No current facility-administered medications on file prior to encounter.      Allergies:  Patient has no known allergies.    Psychiatric Review Of Systems:  Denies: Awake, alert  Behavior/Cooperation: normal, cooperative  Psychomotor: unremarkable   Speech: normal tone, normal rate, normal pitch, normal volume  Language: english, fluid  Orientation: grossly intact  Attention Span/Concentration: intact  Memory: Grossly intact  Mood: "Pretty good"  Affect: normal, calm  Thought Process: linear, normal and logical  Associations: normal and logical  Thought Content: normal, no suicidality, no homicidality, delusions, or paranoia  Fund of Knowledge: Aware of current events  Abstraction: Grossly intact  Insight: fair  Judgment: Intact    Laboratory:  Recent Results (from the past 168 hour(s))   THC , urine, confirmation    Collection Time: 06/21/19  2:44 PM   Result Value Ref Range    THC Conf Chain of Custody Test Not Performed     THC GC/MS Conf 50 Cutoff: 3.0 ng/mL    THC Conf. Interp. Positive.  "   Toxicology screen, urine    Collection Time: 06/24/19 11:48 AM   Result Value Ref Range    Alcohol, Urine <10 <10 mg/dL    Benzodiazepines Negative     Methadone metabolites Negative     Cocaine (Metab.) Negative     Opiate Scrn, Ur Negative     Barbiturate Screen, Ur Negative     Amphetamine Screen, Ur Negative     THC Presumptive Positive     Phencyclidine Negative     Creatinine, Random Ur 129.0 23.0 - 375.0 mg/dL    Toxicology Information SEE COMMENT    Alcohol Biomarkers, urine    Collection Time: 06/24/19 11:49 AM   Result Value Ref Range    Ethyl Glucuronide NEGATIVE    POCT BREATH ALCOHOL TEST    Collection Time: 06/24/19 11:50 AM   Result Value Ref Range    Breath Alcohol 0.000    THC , urine, confirmation    Collection Time: 06/24/19  5:39 PM   Result Value Ref Range    THC Conf Chain of Custody Test Not Performed     THC GC/MS Conf Test Not Performed     THC Conf. Interp. Test Not Performed    Toxicology screen, urine    Collection Time: 06/26/19 10:16 AM   Result Value Ref Range    Alcohol, Urine <10 <10 mg/dL    Benzodiazepines Negative     Methadone metabolites Negative     Cocaine (Metab.) Negative     Opiate Scrn, Ur Negative     Barbiturate Screen, Ur Negative     Amphetamine Screen, Ur Negative     THC Presumptive Positive     Phencyclidine Negative     Creatinine, Random Ur 235.0 23.0 - 375.0 mg/dL    Toxicology Information SEE COMMENT    POCT BREATH ALCOHOL TEST    Collection Time: 06/26/19 10:17 AM   Result Value Ref Range    Breath Alcohol 0.000    POCT BREATH ALCOHOL TEST    Collection Time: 06/27/19 10:38 AM   Result Value Ref Range    Breath Alcohol 0.000      ASSESSMENT:     Rosendo Riojas JrRoddy is a 23 y.o. male with no known past psychiatric hx, who presented to ABU IOP on 6/20/19  following hospitalization at [facility] for dino in the setting of polysubstance use, including cocaine, THC (possibly synthetics), MDMA, LSD. Pt cleared quickly with abstinence and low dose risperidone. Pt reports  escalating use over time, interference with school and relationships, use in hazardous situations. No prior addiction treatment, pt presents as motivated to engage in treatment and achieve sobriety at this time, would be at elevated risk of relapse without adequate support and structure. No longer on antipsychotic, presenting without symptoms of psychosis or dino, per family appears to be at baseline. UDS has remained + for THC, though available confirmation levels have been trending down.  Meets criteria for IOP level of care.      IMPRESSION  Cannabis Use Disorder Severe  Hallucinogen Use Disorder moderate-severe  Alcohol Use Disorder Moderate  Stimulant Use Disorder Moderate  Substance Induced Mood D/O, r/o bipolar d/o    PLAN:     · Cont patient on ABU protocol.  · Breathalyzer daily and urine toxicology at least three times per week. Follow THC levels.   · Patient counseled on abstinence from drugs, alcohol, and non-prescribed medication     Medications:   · Will continue to assess addictive symptoms, including cravings. Can consider naltrexone, acamprosate, or gabapentin for treatment of addiction, though no medications available with evidence base for treatment of cannabis use d/o, stimulant use d/o, or hallucinogen use d/o.   · Hold risperdal, patient has not taken since d/c from [facility]. No overt evidence of psychotic or manic symptoms with abstinence from substances, though will monitor closely and can restart if needed, pt has prescription.      Status: Continue treatment on ABU     This case was seen and discussed with attending psychiatrist MD Raven Castano MD  [employer] Psychiatry Fellow PGY-7      Staff note : I, Rubin Sainz MD have personally seen and evaluated the patient on 6-28-19  , and reviewed the above  history and exam. I agree and concur with the current assessment and plan.

## 2019-06-28 NOTE — PLAN OF CARE
06/28/19 1300   Activity/Group Therapy Checklist   Group Goals/Reflection  (Identifying Values)   Attendance Attended   Follows Direction Followed directions   Group Interactions/Observations Interacted appropriately   Affect/Mood Range Normal range   Affect/Mood Display Appropriate   Goal Progression Progressing

## 2019-06-28 NOTE — PROGRESS NOTES
Group Psychotherapy (PhD/LCSW)    Site: [facility]    Clinical status of patient: Intensive Outpatient Program (IOP)    Date: 6/28/2019    Group Focus: Stress Management    Length of service: 53506 - 45-50 minutes    Number of patients in attendance: 12    Referred by: Addictive Behavior Unit Treatment Team    Target symptoms: Substance Abuse    Patient's response to treatment: Active Listening    Progress toward goals: Progressing adequately    Interval History: Group learned and practiced mindfulness techniques (extended sensory meditation) to improve present-moment awareness, impulsive behavior tendencies, and tolerance of various emotional states.    Diagnosis: Synthetic Cannabinoid Abuse    Plan: Continue treatment on ABU

## 2019-07-01 ENCOUNTER — HOSPITAL ENCOUNTER (OUTPATIENT)
Dept: PSYCHIATRY | Facility: HOSPITAL | Age: 24
Discharge: HOME | End: 2019-07-01
Attending: PSYCHIATRY & NEUROLOGY
Payer: COMMERCIAL

## 2019-07-01 VITALS — DIASTOLIC BLOOD PRESSURE: 58 MMHG | RESPIRATION RATE: 16 BRPM | SYSTOLIC BLOOD PRESSURE: 118 MMHG | HEART RATE: 58 BPM

## 2019-07-01 DIAGNOSIS — F19.10 SYNTHETIC CANNABINOID ABUSE: Primary | ICD-10-CM

## 2019-07-01 DIAGNOSIS — F12.10 CANNABIS ABUSE: ICD-10-CM

## 2019-07-01 LAB
AMPHET+METHAMPHET UR QL: NEGATIVE
BARBITURATES UR QL SCN>200 NG/ML: NEGATIVE
BENZODIAZ UR QL SCN>200 NG/ML: NEGATIVE
BREATH ALCOHOL: 0
BZE UR QL SCN: NEGATIVE
CANNABINOIDS UR QL SCN: NORMAL
CREAT UR-MCNC: 198 MG/DL (ref 23–375)
ETHANOL UR-MCNC: <10 MG/DL
METHADONE UR QL SCN>300 NG/ML: NEGATIVE
OPIATES UR QL SCN: NEGATIVE
PCP UR QL SCN>25 NG/ML: NEGATIVE
TOXICOLOGY INFORMATION: NORMAL

## 2019-07-01 PROCEDURE — 90853 GROUP PSYCHOTHERAPY: CPT

## 2019-07-01 PROCEDURE — 90853 PR GROUP PSYCHOTHERAPY: ICD-10-PCS | Mod: ,,, | Performed by: PSYCHOLOGIST

## 2019-07-01 PROCEDURE — 90853 GROUP PSYCHOTHERAPY: CPT | Performed by: SOCIAL WORKER

## 2019-07-01 PROCEDURE — 90853 GROUP PSYCHOTHERAPY: CPT | Mod: ,,, | Performed by: PSYCHOLOGIST

## 2019-07-01 PROCEDURE — 80307 DRUG TEST PRSMV CHEM ANLYZR: CPT

## 2019-07-01 PROCEDURE — 80349 CANNABINOIDS NATURAL: CPT

## 2019-07-01 NOTE — PROGRESS NOTES
"2019 10:22 AM  Name: Rosendo Riojas Jr.  : 1995  Start Date: 19    ABU Intensive Outpatient Program   Progress Note    Status: Intensive Outpatient Program (IOP)    HPI:  Rosendo Riojas Jr. is a 23 y.o. male with no prior psych history, who presented to ABU IOP due to polysubstance use and recent admission to [facility] for substance induced dino . Per review of [facility] d/c summary, pt presented as manic and grandiose in the setting of recent THC, cocaine, and ecstasy use, cleared quickly during admission and was referred to IOP.      Pt reports that he was at [facility] from doing "stupid things," says he was using drugs and alcohol, cocaine, LSD, alcohol, MDMA, cannabis. Had been feeling sick for about a week, throwing up, not thinking clearly. Hospitalized for a few days, says that he is thinking more clearly, feels a lot better physically.      Reports that he has been using for a few years. Started with weed, then tried LSD. Sometimes xanax, sometimes cocaine. Was selling weed for a while but stopped last year. Notes that substance use has interfered with school work and relationships. Has never tried to stop before, though has tried to cut back unsuccessfully. Never been to treatment.      Marijuana: Had slowed down recently, but had been smoking every day. Started smoking at 18. Feels that it "changed the way [he] viewed things. Got in the way of school/work, affected productivity ,caused conflict. Didn't intentionally smoke synthetics, says that they scared him.      Cocaine: Started a few years ago, initially started as something he would do occasionally, once at a festival, here and there, then became a little more frequent, maybe 1x/month. Never tried crack     LSD: First tried sometime last year or the year before. Began microdosing once he realized he was using too much, would use 1-2x/month. Started microdosing last year, would use half a tab. Liked the openness it gave him, but found that when he " "focused on something bad, it would get worse.      MDMA: Favorite, likes the creativity that he gave him a lot of creativity, would use a couple of times a month, combine with acid.      Opioids: Never tried heroin, tried oxycodone once, made him feel very sick.      Xanax: When other people around him had it, every few months, didn't really like it, made him feel apathetic.      Alcohol: First beer at a party in . Last couple of semesters was drinking pretty heavily, vodka, could go through a handle in a few days-weeks. Beer occasionally, when chilling with friends. ETOH would heighten certain emotions, like anger.        SUBJECTIVE:     Interval Hx:  6/24/19  Pt reports doing pretty well. Had a good weekend, got good sleep. Spent time with family. Denies cravings, said he felt like he got a "natrual high' off doing "normal things" with his family. Sleeping well, going to bed around 10pm. Mood pretty good. Denies use. Going to meetings. Has not started back on risperdal, mom hasn't picked it up, unsure if he needs it.     Toxicology Screen: UDS 6/21 +THC, confirmation pending, Breath ETOH 6/21 Neg   Medication Side Effects: None  Cravings: no  No other acute psychiatric issues reported at this time.    6/26/19  Pt reports doing pretty well. Mood has been good, does feel bored at times which will lead to feeling frustrated, reports that when he feels this way, will work on art, listen to music, exercise, and that these things help him a lot. Groups have been helpful, does express concern about another peer who has been bringing up self-injury in groups, Ro finds this distressing. Otherwise finds program helpful.  Going to AA meetings on the [facility], likes them. Denies use or cravings. Reports thinking feels clear again. Sleeping well, bed around 11-12, sleeps through the night. Eating well. No SI. Did not restart risperdal since hospitalization, not feeling he needs it (consistent with family report yesterday). " "No other concerns.     Toxicology Screen: UDS 6/24 +THC, 6/21 + THC confirmation pending, 6/20 + THC  Level 64 Breath ETOH 6/26, 6/24 Neg, ETOH Biomarkers 6/24: Neg  Medication Side Effects: None  Cravings: no  No other acute psychiatric issues reported at this time.    6/27/19  Pt reports doing well. Enjoying groups. Mood has been good, denies any irritability, agitation, or strange experiences. Sleeping well. Denies NM's. Not taking risperdal, hasn't felt he needed it. Going to meetings every day, found a sponsor named Parrish. No cravings. Getting along well with family. Denies any other concerns.     Toxicology Screen: UDS 6/26 +THC 6/24 +THC, 6/21 + THC Level 50 6/20 + THC  Level 64 Breath ETOH 6/26, 6/27 Neg, ETOH Biomarkers 6/24: Neg  Medication Side Effects: None  Cravings: no  No other acute psychiatric issues reported at this time.    7/1/19  Patient did well through the weekend, went to meetings, did not experience cravings or relapses. Mood has been good, sleeping well without any sleep aids. Patient would like to know the date of his last day in the program. No other questions/concerns today.    Toxicology Screen: UDS 6/28 +THC 6/26 +THC 6/24 +THC, 6/21 + THC Level 50 6/20 + THC  Level 64 Breath ETOH 6/26, 6/27, 7/1 Neg, ETOH Biomarkers 6/24: Neg  Medication Side Effects: None  Cravings: no  No other acute psychiatric issues reported at this time.    Scheduled Meds:   No current outpatient medications on file prior to encounter.     No current facility-administered medications on file prior to encounter.      Allergies:  Patient has no known allergies.    Psychiatric Review Of Systems:  Denies: Awake, alert  Behavior/Cooperation: normal, cooperative  Psychomotor: unremarkable   Speech: normal tone, normal rate, normal pitch, normal volume  Language: english, fluid  Orientation: grossly intact  Attention Span/Concentration: intact  Memory: Grossly intact  Mood: "Pretty good"  Affect: normal, calm  Thought " Process: linear, normal and logical  Associations: normal and logical  Thought Content: normal, no suicidality, no homicidality, delusions, or paranoia  Fund of Knowledge: Aware of current events  Abstraction: Grossly intact  Insight: fair  Judgment: Intact    Laboratory:  Recent Results (from the past 168 hour(s))   Toxicology screen, urine    Collection Time: 06/24/19 11:48 AM   Result Value Ref Range    Alcohol, Urine <10 <10 mg/dL    Benzodiazepines Negative     Methadone metabolites Negative     Cocaine (Metab.) Negative     Opiate Scrn, Ur Negative     Barbiturate Screen, Ur Negative     Amphetamine Screen, Ur Negative     THC Presumptive Positive     Phencyclidine Negative     Creatinine, Random Ur 129.0 23.0 - 375.0 mg/dL    Toxicology Information SEE COMMENT    Alcohol Biomarkers, urine    Collection Time: 06/24/19 11:49 AM   Result Value Ref Range    Ethyl Glucuronide NEGATIVE    POCT BREATH ALCOHOL TEST    Collection Time: 06/24/19 11:50 AM   Result Value Ref Range    Breath Alcohol 0.000    THC , urine, confirmation    Collection Time: 06/24/19  5:39 PM   Result Value Ref Range    THC Conf Chain of Custody Test Not Performed     THC GC/MS Conf Test Not Performed     THC Conf. Interp. Test Not Performed    Toxicology screen, urine    Collection Time: 06/26/19 10:16 AM   Result Value Ref Range    Alcohol, Urine <10 <10 mg/dL    Benzodiazepines Negative     Methadone metabolites Negative     Cocaine (Metab.) Negative     Opiate Scrn, Ur Negative     Barbiturate Screen, Ur Negative     Amphetamine Screen, Ur Negative     THC Presumptive Positive     Phencyclidine Negative     Creatinine, Random Ur 235.0 23.0 - 375.0 mg/dL    Toxicology Information SEE COMMENT    POCT BREATH ALCOHOL TEST    Collection Time: 06/26/19 10:17 AM   Result Value Ref Range    Breath Alcohol 0.000    POCT BREATH ALCOHOL TEST    Collection Time: 06/27/19 10:38 AM   Result Value Ref Range    Breath Alcohol 0.000    Toxicology screen,  urine    Collection Time: 06/28/19 11:53 AM   Result Value Ref Range    Alcohol, Urine <10 <10 mg/dL    Benzodiazepines Negative     Methadone metabolites Negative     Cocaine (Metab.) Negative     Opiate Scrn, Ur Negative     Barbiturate Screen, Ur Negative     Amphetamine Screen, Ur Negative     THC Presumptive Positive     Phencyclidine Negative     Creatinine, Random Ur 137.0 23.0 - 375.0 mg/dL    Toxicology Information SEE COMMENT    POCT BREATH ALCOHOL TEST    Collection Time: 06/28/19 11:54 AM   Result Value Ref Range    Breath Alcohol 0.000      ASSESSMENT:     Rosendo Riojas Jr. is a 23 y.o. male with no known past psychiatric hx, who presented to ABU IOP on 6/20/19  following hospitalization at [facility] for dino in the setting of polysubstance use, including cocaine, THC (possibly synthetics), MDMA, LSD. Pt cleared quickly with abstinence and low dose risperidone. Pt reports escalating use over time, interference with school and relationships, use in hazardous situations. No prior addiction treatment, pt presents as motivated to engage in treatment and achieve sobriety at this time, would be at elevated risk of relapse without adequate support and structure. No longer on antipsychotic, presenting without symptoms of psychosis or dino, per family appears to be at baseline. UDS has remained + for THC, though available confirmation levels have been trending down.  Meets criteria for IOP level of care.      IMPRESSION  Cannabis Use Disorder Severe  Hallucinogen Use Disorder moderate-severe  Alcohol Use Disorder Moderate  Stimulant Use Disorder Moderate  Substance Induced Mood D/O, r/o bipolar d/o    PLAN:     · Cont patient on ABU protocol.  · Breathalyzer daily and urine toxicology at least three times per week. Follow THC levels.   · Patient counseled on abstinence from drugs, alcohol, and non-prescribed medication     Medications:   · Will continue to assess addictive symptoms, including cravings. Can  consider naltrexone, acamprosate, or gabapentin for treatment of addiction, though no medications available with evidence base for treatment of cannabis use d/o, stimulant use d/o, or hallucinogen use d/o.   · Hold Risperdal, patient has not taken since d/c from [facility]. No overt evidence of psychotic or manic symptoms with abstinence from substances, though will monitor closely and can restart if needed, pt has prescription.      Status: Continue treatment on ABU     This case was seen and discussed with attending psychiatrist MD Sandra Castano MD

## 2019-07-01 NOTE — PLAN OF CARE
07/01/19 1000   Activity/Group Therapy Checklist   Group Goals/Reflection  (prompts )   Attendance Attended   Follows Direction Followed directions   Group Interactions/Observations Interacted appropriately   Affect/Mood Range Normal range   Affect/Mood Display Appropriate   Goal Progression Progressing

## 2019-07-01 NOTE — PROGRESS NOTES
Group Psychotherapy (PhD/LCSW)    Site: [facility]    Clinical status of patient: Intensive Outpatient Program (IOP)    Date: 7/1/2019    Group Focus: DBT-Based Group Psychotherapy    Length of service: 72594 - 45-50 minutes    Number of patients in attendance: 11    Referred by: Addictive Behavior Unit Treatment Team    Target symptoms: Substance Abuse    Patient's response to treatment: Inactive Listening    Progress toward goals: Progressing adequately    Interval History: Session focus was Interpersonal Effectiveness:  Validation.  Patients were encouraged to focus on validation of others by enhancing their ability to hear, understand, and respect others.  He was drawing during much of group.  He read when prompted at his turn, but otherwise did not engage much.    Diagnosis: Synthetic cannabinoid abuse    Plan: Continue treatment on ABU

## 2019-07-01 NOTE — PROGRESS NOTES
Group Psychotherapy (PhD/LCSW)    Site: [facility]    Clinical status of patient: Intensive Outpatient Program (IOP)    Date: 7/1/2019    Group Focus: Psychodynamic Group Psychotherapy    Length of service: 74337 - 45-50 minutes    Number of patients in attendance: 11    Referred by: Addictive Behavior Unit Treatment Team    Target symptoms: Substance Abuse    Patient's response to treatment: Inactive Listening    Progress toward goals: Progressing adequately    Interval History: He noted it was positive that he has gotten good sleep and negative that he has to catch up on homework.  He made a commitment to schedule a test tonight.    Diagnosis: Synthetic cannabinoid abuse    Plan: Continue treatment on ABU

## 2019-07-02 ENCOUNTER — HOSPITAL ENCOUNTER (OUTPATIENT)
Dept: PSYCHIATRY | Facility: HOSPITAL | Age: 24
Discharge: HOME | End: 2019-07-02
Attending: PSYCHIATRY & NEUROLOGY
Payer: COMMERCIAL

## 2019-07-02 DIAGNOSIS — F19.10 SYNTHETIC CANNABINOID ABUSE: Primary | ICD-10-CM

## 2019-07-02 DIAGNOSIS — F12.10 CANNABIS ABUSE: ICD-10-CM

## 2019-07-02 LAB
BREATH ALCOHOL: 0
THC CONFIRMATION CHAIN OF CUSTODY: NORMAL
THC UR-MCNC: 68 NG/ML
TOXICOLOGIST REVIEW: NORMAL

## 2019-07-02 PROCEDURE — 90853 GROUP PSYCHOTHERAPY: CPT | Performed by: SOCIAL WORKER

## 2019-07-02 PROCEDURE — 90853 GROUP PSYCHOTHERAPY: CPT | Mod: ,,, | Performed by: PSYCHOLOGIST

## 2019-07-02 PROCEDURE — 90853 PR GROUP PSYCHOTHERAPY: ICD-10-PCS | Mod: ,,, | Performed by: PSYCHOLOGIST

## 2019-07-02 PROCEDURE — 90853 GROUP PSYCHOTHERAPY: CPT

## 2019-07-02 NOTE — PROGRESS NOTES
Group Psychotherapy (PhD/LCSW)    Site: [facility]    Clinical status of patient: Intensive Outpatient Program (IOP)    Date: 7/2/2019    Group Focus: Psychodynamic Group Psychotherapy    Length of service: 80406 - 45-50 minutes    Number of patients in attendance: 12    Referred by: Addictive Behavior Unit Treatment Team    Target symptoms: Substance Abuse    Patient's response to treatment: Inactive Listening    Progress toward goals: Progressing adequately    Interval History: He noted it was positive that he went to the gym for the first time in a while, and negative that he had cold sweats at night.  He made a commitment to put forth A-effort on his test tonight.    Diagnosis: Synthetic cannabinoid abuse    Plan: Continue treatment on ABU

## 2019-07-02 NOTE — PROGRESS NOTES
Group Psychotherapy (PhD/LCSW)    Site: [facility]    Clinical status of patient: Intensive Outpatient Program (IOP)    Date: 7/2/2019    Group Focus: DBT-Based Group Psychotherapy    Length of service: 13222 - 45-50 minutes    Number of patients in attendance: 12    Referred by: Addictive Behavior Unit Treatment Team    Target symptoms: Substance Abuse    Patient's response to treatment: Active Listening; Self-Disclosure    Progress toward goals: Progressing adequately    Interval History: Session focus was Interpersonal Effectiveness:  Validation (Part 2).  Patients practiced validation of others through role-play and examples.    Diagnosis: Synthetic cannabinoid abuse    Plan: Continue treatment on ABU

## 2019-07-02 NOTE — PROGRESS NOTES
Group Psychotherapy (PhD/LCSW)    Site: [facility]    Clinical status of patient: Intensive Outpatient Program (IOP)    Date: 7/2/2019    Group Focus: Brain Disease Model of Addiction    Length of service: 33678 - 45-50 minutes    Number of patients in attendance: 8    Referred by: Addictive Behavior Unit Treatment Team    Target symptoms: Substance Abuse    Patient's response to treatment: Active Listening; Self-Disclosure    Progress toward goals: Progressing adequately    Interval History: Session focus was Disease Model of Addiction.  Patients were provided with a worksheet discussing three stages of addiction:  1) binge and intoxication, 2) withdrawal and negative affect, and 3) preoccupation and anticipation.    Diagnosis: Synthetic cannabinoid abuse    Plan: Continue treatment on ABU

## 2019-07-02 NOTE — PLAN OF CARE
07/02/19 1400   Activity/Group Therapy Checklist   Group Goals/Reflection  (miracle question  )   Attendance Attended   Follows Direction Followed directions   Group Interactions/Observations Interacted appropriately   Affect/Mood Range Normal range   Affect/Mood Display Appropriate   Goal Progression Progressing

## 2019-07-03 ENCOUNTER — HOSPITAL ENCOUNTER (OUTPATIENT)
Dept: PSYCHIATRY | Facility: HOSPITAL | Age: 24
Discharge: HOME | End: 2019-07-03
Attending: PSYCHIATRY & NEUROLOGY
Payer: COMMERCIAL

## 2019-07-03 VITALS — SYSTOLIC BLOOD PRESSURE: 154 MMHG | HEART RATE: 85 BPM | DIASTOLIC BLOOD PRESSURE: 73 MMHG | RESPIRATION RATE: 16 BRPM

## 2019-07-03 DIAGNOSIS — F19.10 SYNTHETIC CANNABINOID ABUSE: Primary | ICD-10-CM

## 2019-07-03 DIAGNOSIS — F12.10 CANNABIS ABUSE: ICD-10-CM

## 2019-07-03 LAB
BREATH ALCOHOL: 0
THC CONFIRMATION CHAIN OF CUSTODY: NORMAL
THC UR-MCNC: 34 NG/ML
TOXICOLOGIST REVIEW: NORMAL

## 2019-07-03 PROCEDURE — 99233 PR SUBSEQUENT HOSPITAL CARE,LEVL III: ICD-10-PCS | Mod: ,,, | Performed by: PSYCHIATRY & NEUROLOGY

## 2019-07-03 PROCEDURE — 90853 PR GROUP PSYCHOTHERAPY: ICD-10-PCS | Mod: ,,, | Performed by: PSYCHOLOGIST

## 2019-07-03 PROCEDURE — 90853 GROUP PSYCHOTHERAPY: CPT | Performed by: SOCIAL WORKER

## 2019-07-03 PROCEDURE — 90853 GROUP PSYCHOTHERAPY: CPT | Mod: 59,,, | Performed by: PSYCHOLOGIST

## 2019-07-03 PROCEDURE — 80307 DRUG TEST PRSMV CHEM ANLYZR: CPT

## 2019-07-03 PROCEDURE — 90853 GROUP PSYCHOTHERAPY: CPT

## 2019-07-03 PROCEDURE — 99233 SBSQ HOSP IP/OBS HIGH 50: CPT | Mod: ,,, | Performed by: PSYCHIATRY & NEUROLOGY

## 2019-07-03 NOTE — PROGRESS NOTES
Group Psychotherapy (PhD/LCSW)    Site: [facility]    Clinical status of patient: Intensive Outpatient Program (IOP)    Date: 7/3/2019    Group Focus: DBT-Based Group Psychotherapy    Length of service: 67245 - 45-50 minutes    Number of patients in attendance: 11    Referred by: Addictive Behavior Unit Treatment Team    Target symptoms: Substance Abuse    Patient's response to treatment: Active Listening; Self-Disclosure    Progress toward goals: Progressing adequately    Interval History: Session focus was Interpersonal Effectiveness:  Validation (Part 3). Patients were encouraged to focus on validation of themselves by enhancing their ability to hear, understand, and respect themselves.    Diagnosis: Synthetic cannabinoid abuse    Plan: Continue treatment on ABU

## 2019-07-03 NOTE — PROGRESS NOTES
Group Psychotherapy (PhD/LCSW)    Site: [facility]    Clinical status of patient: Intensive Outpatient Program (IOP)    Date: 7/3/2019    Group Focus: Psychodynamic Group Psychotherapy    Length of service: 83735 - 45-50 minutes    Number of patients in attendance: 11    Referred by: Addictive Behavior Unit Treatment Team    Target symptoms: Substance Abuse    Patient's response to treatment: Active Listening; Self-Disclosure; Feedback given to another patient    Progress toward goals: Progressing adequately    Interval History: He noted it was positive that he got good sleep but negative that he did not exercise.    Diagnosis: Synthetic cannabinoid abuse    Plan: Continue treatment on ABU

## 2019-07-03 NOTE — PROGRESS NOTES
"7/3/2019 10:22 AM  Name: Rosendo Riojas Jr.  : 1995  Start Date: 19    ABU Intensive Outpatient Program   Progress Note    Status: Intensive Outpatient Program (IOP)    HPI:  Rosendo Riojas Jr. is a 23 y.o. male with no prior psych history, who presented to ABU IOP due to polysubstance use and recent admission to [facility] for substance induced dino . Per review of [facility] d/c summary, pt presented as manic and grandiose in the setting of recent THC, cocaine, and ecstasy use, cleared quickly during admission and was referred to IOP.      Pt reports that he was at [facility] from doing "stupid things," says he was using drugs and alcohol, cocaine, LSD, alcohol, MDMA, cannabis. Had been feeling sick for about a week, throwing up, not thinking clearly. Hospitalized for a few days, says that he is thinking more clearly, feels a lot better physically.      Reports that he has been using for a few years. Started with weed, then tried LSD. Sometimes xanax, sometimes cocaine. Was selling weed for a while but stopped last year. Notes that substance use has interfered with school work and relationships. Has never tried to stop before, though has tried to cut back unsuccessfully. Never been to treatment.      Marijuana: Had slowed down recently, but had been smoking every day. Started smoking at 18. Feels that it "changed the way [he] viewed things. Got in the way of school/work, affected productivity ,caused conflict. Didn't intentionally smoke synthetics, says that they scared him.      Cocaine: Started a few years ago, initially started as something he would do occasionally, once at a festival, here and there, then became a little more frequent, maybe 1x/month. Never tried crack     LSD: First tried sometime last year or the year before. Began microdosing once he realized he was using too much, would use 1-2x/month. Started microdosing last year, would use half a tab. Liked the openness it gave him, but found that when he " "focused on something bad, it would get worse.      MDMA: Favorite, likes the creativity that he gave him a lot of creativity, would use a couple of times a month, combine with acid.      Opioids: Never tried heroin, tried oxycodone once, made him feel very sick.      Xanax: When other people around him had it, every few months, didn't really like it, made him feel apathetic.      Alcohol: First beer at a party in . Last couple of semesters was drinking pretty heavily, vodka, could go through a handle in a few days-weeks. Beer occasionally, when chilling with friends. ETOH would heighten certain emotions, like anger.        SUBJECTIVE:     Interval Hx:  6/24/19  Pt reports doing pretty well. Had a good weekend, got good sleep. Spent time with family. Denies cravings, said he felt like he got a "natrual high' off doing "normal things" with his family. Sleeping well, going to bed around 10pm. Mood pretty good. Denies use. Going to meetings. Has not started back on risperdal, mom hasn't picked it up, unsure if he needs it.     Toxicology Screen: UDS 6/21 +THC, confirmation pending, Breath ETOH 6/21 Neg   Medication Side Effects: None  Cravings: no  No other acute psychiatric issues reported at this time.    6/26/19  Pt reports doing pretty well. Mood has been good, does feel bored at times which will lead to feeling frustrated, reports that when he feels this way, will work on art, listen to music, exercise, and that these things help him a lot. Groups have been helpful, does express concern about another peer who has been bringing up self-injury in groups, Ro finds this distressing. Otherwise finds program helpful.  Going to AA meetings on the [facility], likes them. Denies use or cravings. Reports thinking feels clear again. Sleeping well, bed around 11-12, sleeps through the night. Eating well. No SI. Did not restart risperdal since hospitalization, not feeling he needs it (consistent with family report yesterday). " No other concerns.     Toxicology Screen: UDS 6/24 +THC, 6/21 + THC confirmation pending, 6/20 + THC  Level 64 Breath ETOH 6/26, 6/24 Neg, ETOH Biomarkers 6/24: Neg  Medication Side Effects: None  Cravings: no  No other acute psychiatric issues reported at this time.    6/27/19  Pt reports doing well. Enjoying groups. Mood has been good, denies any irritability, agitation, or strange experiences. Sleeping well. Denies NM's. Not taking risperdal, hasn't felt he needed it. Going to meetings every day, found a sponsor named Parrish. No cravings. Getting along well with family. Denies any other concerns.     Toxicology Screen: UDS 6/26 +THC 6/24 +THC, 6/21 + THC Level 50 6/20 + THC  Level 64 Breath ETOH 6/26, 6/27 Neg, ETOH Biomarkers 6/24: Neg  Medication Side Effects: None  Cravings: no  No other acute psychiatric issues reported at this time.    7/1/19  Patient did well through the weekend, went to meetings, did not experience cravings or relapses. Mood has been good, sleeping well without any sleep aids. Patient would like to know the date of his last day in the program. No other questions/concerns today.    Toxicology Screen: UDS 6/28 +THC 6/26 +THC 6/24 +THC, 6/21 + THC Level 50 6/20 + THC  Level 64 Breath ETOH 6/26, 6/27, 7/1 Neg, ETOH Biomarkers 6/24: Neg  Medication Side Effects: None  Cravings: no  No other acute psychiatric issues reported at this time.    7/3/19  Patient doing well this morning, reports benefiting from the program. Denies any cravings. Goes to meetings in the mornings before coming to [facility] so he can have the evening free to relax. Plans to go to [place] at aunt's house tomorrow for 4th of July. No other issues or concerns this morning.    Toxicology Screen: UDS 7/1 +THC UDS 6/28 +THC 6/26 +THC 6/24 +THC, 6/21 + THC Level 50 6/20 + THC  Level 64 Breath ETOH 6/26, 6/27, 7/1, 7/3 Neg, ETOH Biomarkers 6/24: Neg  Medication Side Effects: None  Cravings: no  No other acute psychiatric issues reported  "at this time.    Scheduled Meds:   No current outpatient medications on file prior to encounter.     No current facility-administered medications on file prior to encounter.      Allergies:  Patient has no known allergies.    Psychiatric Review Of Systems:  Denies: Awake, alert  Behavior/Cooperation: normal, cooperative  Psychomotor: unremarkable   Speech: normal tone, normal rate, normal pitch, normal volume  Language: english, fluid  Orientation: grossly intact  Attention Span/Concentration: intact  Memory: Grossly intact  Mood: "Pretty good"  Affect: normal, calm  Thought Process: linear, normal and logical  Associations: normal and logical  Thought Content: normal, no suicidality, no homicidality, delusions, or paranoia  Fund of Knowledge: Aware of current events  Abstraction: Grossly intact  Insight: fair  Judgment: Intact    Laboratory:  Recent Results (from the past 168 hour(s))   Toxicology screen, urine    Collection Time: 06/26/19 10:16 AM   Result Value Ref Range    Alcohol, Urine <10 <10 mg/dL    Benzodiazepines Negative     Methadone metabolites Negative     Cocaine (Metab.) Negative     Opiate Scrn, Ur Negative     Barbiturate Screen, Ur Negative     Amphetamine Screen, Ur Negative     THC Presumptive Positive     Phencyclidine Negative     Creatinine, Random Ur 235.0 23.0 - 375.0 mg/dL    Toxicology Information SEE COMMENT    POCT BREATH ALCOHOL TEST    Collection Time: 06/26/19 10:17 AM   Result Value Ref Range    Breath Alcohol 0.000    POCT BREATH ALCOHOL TEST    Collection Time: 06/27/19 10:38 AM   Result Value Ref Range    Breath Alcohol 0.000    THC , urine, confirmation    Collection Time: 06/27/19 12:03 PM   Result Value Ref Range    THC Conf Chain of Custody Test Not Performed     THC GC/MS Conf 68 Cutoff: 3.0 ng/mL    THC Conf. Interp. Positive.    Toxicology screen, urine    Collection Time: 06/28/19 11:53 AM   Result Value Ref Range    Alcohol, Urine <10 <10 mg/dL    Benzodiazepines " Negative     Methadone metabolites Negative     Cocaine (Metab.) Negative     Opiate Scrn, Ur Negative     Barbiturate Screen, Ur Negative     Amphetamine Screen, Ur Negative     THC Presumptive Positive     Phencyclidine Negative     Creatinine, Random Ur 137.0 23.0 - 375.0 mg/dL    Toxicology Information SEE COMMENT    POCT BREATH ALCOHOL TEST    Collection Time: 06/28/19 11:54 AM   Result Value Ref Range    Breath Alcohol 0.000    Toxicology screen, urine    Collection Time: 07/01/19 10:46 AM   Result Value Ref Range    Alcohol, Urine <10 <10 mg/dL    Benzodiazepines Negative     Methadone metabolites Negative     Cocaine (Metab.) Negative     Opiate Scrn, Ur Negative     Barbiturate Screen, Ur Negative     Amphetamine Screen, Ur Negative     THC Presumptive Positive     Phencyclidine Negative     Creatinine, Random Ur 198.0 23.0 - 375.0 mg/dL    Toxicology Information SEE COMMENT    POCT BREATH ALCOHOL TEST    Collection Time: 07/01/19 10:48 AM   Result Value Ref Range    Breath Alcohol 0.000    POCT BREATH ALCOHOL TEST    Collection Time: 07/02/19 10:24 AM   Result Value Ref Range    Breath Alcohol 0.000      ASSESSMENT:     Rosendo Riojas  is a 23 y.o. male with no known past psychiatric hx, who presented to ABU IOP on 6/20/19  following hospitalization at [facility] for dino in the setting of polysubstance use, including cocaine, THC (possibly synthetics), MDMA, LSD. Pt cleared quickly with abstinence and low dose risperidone. Pt reports escalating use over time, interference with school and relationships, use in hazardous situations. No prior addiction treatment, pt presents as motivated to engage in treatment and achieve sobriety at this time, would be at elevated risk of relapse without adequate support and structure. No longer on antipsychotic, presenting without symptoms of psychosis or dino, per family appears to be at baseline. UDS has remained + for THC, though available confirmation levels have been  trending down.  Meets criteria for IOP level of care.      IMPRESSION  Cannabis Use Disorder Severe  Hallucinogen Use Disorder moderate-severe  Alcohol Use Disorder Moderate  Stimulant Use Disorder Moderate  Substance Induced Mood D/O, r/o bipolar d/o    PLAN:     · Cont patient on ABU protocol.  · Breathalyzer daily and urine toxicology at least three times per week. Follow THC levels.   · Patient counseled on abstinence from drugs, alcohol, and non-prescribed medications.     Medications:   · Will continue to assess addictive symptoms, including cravings. Can consider naltrexone, acamprosate, or gabapentin for treatment of addiction, though no medications available with evidence base for treatment of cannabis use d/o, stimulant use d/o, or hallucinogen use d/o.   · Continue to hold Risperdal, patient has not taken since d/c from [facility]. No overt evidence of psychotic or manic symptoms with abstinence from substances, though will monitor closely and can restart if needed, pt has prescription.      Status: Continue treatment on ABU     This case was seen and discussed with attending psychiatrist MD Sandra Castano MD      Staff note : I, Rubin Sainz MD have personally seen and evaluated the patient on 7-3-19  , and reviewed the above history and exam. I agree and concur with the current assessment and plan.

## 2019-07-03 NOTE — PLAN OF CARE
07/03/19 1400   Activity/Group Therapy Checklist   Group Relapse Prevention   Attendance Attended   Follows Direction Followed directions   Group Interactions/Observations Interacted appropriately   Affect/Mood Range Normal range   Affect/Mood Display Appropriate   Goal Progression Progressing

## 2019-07-04 LAB — ETHYL GLUCURONIDE: NEGATIVE

## 2019-07-05 ENCOUNTER — HOSPITAL ENCOUNTER (OUTPATIENT)
Dept: PSYCHIATRY | Facility: HOSPITAL | Age: 24
Discharge: HOME | End: 2019-07-05
Attending: PSYCHIATRY & NEUROLOGY
Payer: COMMERCIAL

## 2019-07-05 DIAGNOSIS — F12.10 CANNABIS ABUSE: ICD-10-CM

## 2019-07-05 DIAGNOSIS — F19.10 SYNTHETIC CANNABINOID ABUSE: Primary | ICD-10-CM

## 2019-07-05 LAB
AMPHET+METHAMPHET UR QL: NEGATIVE
BARBITURATES UR QL SCN>200 NG/ML: NEGATIVE
BENZODIAZ UR QL SCN>200 NG/ML: NEGATIVE
BZE UR QL SCN: NEGATIVE
CANNABINOIDS UR QL SCN: NORMAL
CREAT UR-MCNC: 104 MG/DL (ref 23–375)
ETHANOL UR-MCNC: <10 MG/DL
METHADONE UR QL SCN>300 NG/ML: NEGATIVE
OPIATES UR QL SCN: NEGATIVE
PCP UR QL SCN>25 NG/ML: NEGATIVE
THC CONFIRMATION CHAIN OF CUSTODY: NORMAL
THC UR-MCNC: 33 NG/ML
TOXICOLOGIST REVIEW: NORMAL
TOXICOLOGY INFORMATION: NORMAL

## 2019-07-05 PROCEDURE — 90853 GROUP PSYCHOTHERAPY: CPT | Mod: ,,, | Performed by: PSYCHOLOGIST

## 2019-07-05 PROCEDURE — 90853 PR GROUP PSYCHOTHERAPY: ICD-10-PCS | Mod: 59,,, | Performed by: PSYCHOLOGIST

## 2019-07-05 PROCEDURE — 90853 GROUP PSYCHOTHERAPY: CPT | Performed by: SOCIAL WORKER

## 2019-07-05 PROCEDURE — 80349 CANNABINOIDS NATURAL: CPT

## 2019-07-05 NOTE — PROGRESS NOTES
Group Psychotherapy (PhD/LCSW)    Site: [facility]    Clinical status of patient: Intensive Outpatient Program (IOP)    Date: 7/5/2019    Group Focus: Psychodynamic Group Psychotherapy    Length of service: 76193 - 45-50 minutes    Number of patients in attendance: 11    Referred by: Addictive Behavior Unit Treatment Team    Target symptoms: Substance Abuse    Patient's response to treatment: Active Listening; Self-Disclosure; Feedback given to another patient    Progress toward goals: Progressing adequately    Interval History: He noted it was positive that he spent time with family with no negatives.  He made a new commitment to exercise.    Diagnosis: Synthetic cannabinoid abuse    Plan: Continue treatment on ABU

## 2019-07-05 NOTE — PLAN OF CARE
07/05/19 1000   Activity/Group Therapy Checklist   Group Addiction Education  (life balance )   Attendance Attended   Follows Direction Followed directions   Group Interactions/Observations Interacted appropriately   Affect/Mood Range Normal range   Affect/Mood Display Appropriate   Goal Progression Progressing

## 2019-07-05 NOTE — PROGRESS NOTES
Group Psychotherapy (PhD/LCSW)    Site: [facility]    Clinical status of patient: Intensive Outpatient Program (IOP)    Date: 7/5/2019    Group Focus: DBT-Based Group Psychotherapy    Length of service: 00215 - 45-50 minutes    Number of patients in attendance: 11    Referred by: Addictive Behavior Unit Treatment Team    Target symptoms: Substance Abuse    Patient's response to treatment: Active Listening; Self-Disclosure    Progress toward goals: Progressing adequately    Interval History: Session focus was Mindfulness.  Patients were introduced to the goals of mindfulness practice and provided with several skills to formally and informally practice mindfulness.    Diagnosis: Synthetic cannabinoid abuse    Plan: Continue treatment on ABU

## 2019-07-08 ENCOUNTER — HOSPITAL ENCOUNTER (OUTPATIENT)
Dept: PSYCHIATRY | Facility: HOSPITAL | Age: 24
Discharge: HOME | End: 2019-07-08
Attending: PSYCHIATRY & NEUROLOGY
Payer: COMMERCIAL

## 2019-07-08 VITALS — RESPIRATION RATE: 16 BRPM | HEART RATE: 76 BPM | DIASTOLIC BLOOD PRESSURE: 75 MMHG | SYSTOLIC BLOOD PRESSURE: 136 MMHG

## 2019-07-08 DIAGNOSIS — F19.10 SYNTHETIC CANNABINOID ABUSE: Primary | ICD-10-CM

## 2019-07-08 DIAGNOSIS — F12.10 CANNABIS ABUSE: ICD-10-CM

## 2019-07-08 LAB
AMPHET+METHAMPHET UR QL: NEGATIVE
BARBITURATES UR QL SCN>200 NG/ML: NEGATIVE
BENZODIAZ UR QL SCN>200 NG/ML: NEGATIVE
BZE UR QL SCN: NEGATIVE
CANNABINOIDS UR QL SCN: NEGATIVE
CREAT UR-MCNC: 108 MG/DL (ref 23–375)
ETHANOL UR-MCNC: <10 MG/DL
METHADONE UR QL SCN>300 NG/ML: NEGATIVE
OPIATES UR QL SCN: NEGATIVE
PCP UR QL SCN>25 NG/ML: NEGATIVE
TOXICOLOGY INFORMATION: NORMAL

## 2019-07-08 PROCEDURE — 80307 DRUG TEST PRSMV CHEM ANLYZR: CPT

## 2019-07-08 PROCEDURE — 90853 PR GROUP PSYCHOTHERAPY: ICD-10-PCS | Mod: ,,, | Performed by: PSYCHOLOGIST

## 2019-07-08 PROCEDURE — 90853 GROUP PSYCHOTHERAPY: CPT | Mod: ,,, | Performed by: PSYCHOLOGIST

## 2019-07-08 PROCEDURE — 90853 GROUP PSYCHOTHERAPY: CPT | Performed by: SOCIAL WORKER

## 2019-07-08 PROCEDURE — 99233 PR SUBSEQUENT HOSPITAL CARE,LEVL III: ICD-10-PCS | Mod: ,,, | Performed by: PSYCHIATRY & NEUROLOGY

## 2019-07-08 PROCEDURE — 99233 SBSQ HOSP IP/OBS HIGH 50: CPT | Mod: ,,, | Performed by: PSYCHIATRY & NEUROLOGY

## 2019-07-08 PROCEDURE — 90853 GROUP PSYCHOTHERAPY: CPT

## 2019-07-08 NOTE — PROGRESS NOTES
Group Psychotherapy (PhD/LCSW)    Site: [facility]    Clinical status of patient: Intensive Outpatient Program (IOP)    Date: 7/8/2019    Group Focus: Psychodynamic Group Psychotherapy    Length of service: 09280 - 45-50 minutes    Number of patients in attendance: 12    Referred by: Addictive Behavior Unit Treatment Team    Target symptoms: Substance Abuse    Patient's response to treatment: Active Listening; Self-Disclosure; Feedback given to another patient    Progress toward goals: Progressing adequately    Interval History: He noted it was positive he hung out with friends and did not report any negatives.  He completed his commitment of exercising and made a new commitment to help his dad fix his car.    Diagnosis: Synthetic cannabinoid abuse    Plan: Continue treatment on ABU

## 2019-07-08 NOTE — PROGRESS NOTES
07/08/19 1400   Activity/Group Therapy Checklist   Group Relapse Prevention  (1st Step)   Attendance Attended   Follows Direction Followed directions   Group Interactions/Observations Interacted appropriately;Alert;Supportive  (pt participated and followed along in group session)   Affect/Mood Range Normal range   Affect/Mood Display Appropriate   Goal Progression Progressing

## 2019-07-08 NOTE — PROGRESS NOTES
"2019 10:22 AM  Name: Rosendo Riojas Jr.  : 1995  Start Date: 19    [facility] Intensive Outpatient Program   Progress Note    Status: Intensive Outpatient Program (IOP)    HPI:  Rosendo Riojas Jr. is a 23 y.o. male with no prior psych history, who presented to [facility] IOP due to polysubstance use and recent admission to [facility] for substance induced dino . Per review of [facility] d/c summary, pt presented as manic and grandiose in the setting of recent THC, cocaine, and ecstasy use, cleared quickly during admission and was referred to IOP.      Pt reports that he was at [facility] from doing "stupid things," says he was using drugs and alcohol, cocaine, LSD, alcohol, MDMA, cannabis. Had been feeling sick for about a week, throwing up, not thinking clearly. Hospitalized for a few days, says that he is thinking more clearly, feels a lot better physically.      Reports that he has been using for a few years. Started with weed, then tried LSD. Sometimes xanax, sometimes cocaine. Was selling weed for a while but stopped last year. Notes that substance use has interfered with school work and relationships. Has never tried to stop before, though has tried to cut back unsuccessfully. Never been to treatment.      Marijuana: Had slowed down recently, but had been smoking every day. Started smoking at 18. Feels that it "changed the way [he] viewed things. Got in the way of school/work, affected productivity ,caused conflict. Didn't intentionally smoke synthetics, says that they scared him.      Cocaine: Started a few years ago, initially started as something he would do occasionally, once at a festival, here and there, then became a little more frequent, maybe 1x/month. Never tried crack     LSD: First tried sometime last year or the year before. Began microdosing once he realized he was using too much, would use 1-2x/month. Started microdosing last year, would use half a tab. Liked the openness it gave him, but found that when he " "focused on something bad, it would get worse.      MDMA: Favorite, likes the creativity that he gave him a lot of creativity, would use a couple of times a month, combine with acid.      Opioids: Never tried heroin, tried oxycodone once, made him feel very sick.      Xanax: When other people around him had it, every few months, didn't really like it, made him feel apathetic.      Alcohol: First beer at a party in . Last couple of semesters was drinking pretty heavily, vodka, could go through a handle in a few days-weeks. Beer occasionally, when chilling with friends. ETOH would heighten certain emotions, like anger.        SUBJECTIVE:     Interval Hx:  6/24/19  Pt reports doing pretty well. Had a good weekend, got good sleep. Spent time with family. Denies cravings, said he felt like he got a "natrual high' off doing "normal things" with his family. Sleeping well, going to bed around 10pm. Mood pretty good. Denies use. Going to meetings. Has not started back on risperdal, mom hasn't picked it up, unsure if he needs it.     Toxicology Screen: UDS 6/21 +THC, confirmation pending, Breath ETOH 6/21 Neg   Medication Side Effects: None  Cravings: no  No other acute psychiatric issues reported at this time.    6/26/19  Pt reports doing pretty well. Mood has been good, does feel bored at times which will lead to feeling frustrated, reports that when he feels this way, will work on art, listen to music, exercise, and that these things help him a lot. Groups have been helpful, does express concern about another peer who has been bringing up self-injury in groups, Ro finds this distressing. Otherwise finds program helpful.  Going to AA meetings on the [facility], likes them. Denies use or cravings. Reports thinking feels clear again. Sleeping well, bed around 11-12, sleeps through the night. Eating well. No SI. Did not restart risperdal since hospitalization, not feeling he needs it (consistent with family report yesterday). " No other concerns.     Toxicology Screen: UDS 6/24 +THC, 6/21 + THC confirmation pending, 6/20 + THC  Level 64 Breath ETOH 6/26, 6/24 Neg, ETOH Biomarkers 6/24: Neg  Medication Side Effects: None  Cravings: no  No other acute psychiatric issues reported at this time.    6/27/19  Pt reports doing well. Enjoying groups. Mood has been good, denies any irritability, agitation, or strange experiences. Sleeping well. Denies NM's. Not taking risperdal, hasn't felt he needed it. Going to meetings every day, found a sponsor named Parrish. No cravings. Getting along well with family. Denies any other concerns.     Toxicology Screen: UDS 6/26 +THC 6/24 +THC, 6/21 + THC Level 50 6/20 + THC  Level 64 Breath ETOH 6/26, 6/27 Neg, ETOH Biomarkers 6/24: Neg  Medication Side Effects: None  Cravings: no  No other acute psychiatric issues reported at this time.    7/1/19  Patient did well through the weekend, went to meetings, did not experience cravings or relapses. Mood has been good, sleeping well without any sleep aids. Patient would like to know the date of his last day in the program. No other questions/concerns today.    Toxicology Screen: UDS 6/28 +THC 6/26 +THC 6/24 +THC, 6/21 + THC Level 50 6/20 + THC  Level 64 Breath ETOH 6/26, 6/27, 7/1 Neg, ETOH Biomarkers 6/24: Neg  Medication Side Effects: None  Cravings: no  No other acute psychiatric issues reported at this time.    7/3/19  Patient doing well this morning, reports benefiting from the program. Denies any cravings. Goes to meetings in the mornings before coming to [facility] so he can have the evening free to relax. Plans to go to [employer] at aunt's house tomorrow for 4th of July. No other issues or concerns this morning.    Toxicology Screen: UDS 7/1 +THC UDS 6/28 +THC 6/26 +THC 6/24 +THC, 6/21 + THC Level 50 6/20 + THC  Level 64 Breath ETOH 6/26, 6/27, 7/1, 7/3 Neg, ETOH Biomarkers 6/24: Neg  Medication Side Effects: None  Cravings: no  No other acute psychiatric issues reported  "at this time.    7/5/19  Patient doing well, had a good 4th of July and a good weekend. No cravings, has not had the need to call his sponsor but knows he's available if he needs to. Has been attending AA meetings. No other issues this morning.    Toxicology Screen: UDS 7/3 +THC UDS 6/28 +THC 6/26 +THC 6/24 +THC, 6/21 + THC Level 50 6/20 + THC  Level 64 Breath ETOH 6/26, 6/27, 7/1, 7/3 Neg, ETOH Biomarkers 6/24: Neg  Medication Side Effects: None  Cravings: no  No other acute psychiatric issues reported at this time.    Scheduled Meds:   No current outpatient medications on file prior to encounter.     No current facility-administered medications on file prior to encounter.      Allergies:  Patient has no known allergies.    Psychiatric Review Of Systems:  Denies: Awake, alert  Behavior/Cooperation: normal, cooperative  Psychomotor: unremarkable   Speech: normal tone, normal rate, normal pitch, normal volume  Language: english, fluid  Orientation: grossly intact  Attention Span/Concentration: intact  Memory: Grossly intact  Mood: "Pretty good"  Affect: normal, calm  Thought Process: linear, normal and logical  Associations: normal and logical  Thought Content: normal, no suicidality, no homicidality, delusions, or paranoia  Fund of Knowledge: Aware of current events  Abstraction: Grossly intact  Insight: fair  Judgment: Intact    Laboratory:  Recent Results (from the past 168 hour(s))   THC , urine, confirmation    Collection Time: 07/01/19  5:09 PM   Result Value Ref Range    THC Conf Chain of Custody Test Not Performed     THC GC/MS Conf 33 Cutoff: 3.0 ng/mL    THC Conf. Interp. Positive.    POCT BREATH ALCOHOL TEST    Collection Time: 07/02/19 10:24 AM   Result Value Ref Range    Breath Alcohol 0.000    Toxicology screen, urine    Collection Time: 07/03/19 10:20 AM   Result Value Ref Range    Alcohol, Urine <10 <10 mg/dL    Benzodiazepines Negative     Methadone metabolites Negative     Cocaine (Metab.) Negative  "    Opiate Scrn, Ur Negative     Barbiturate Screen, Ur Negative     Amphetamine Screen, Ur Negative     THC Presumptive Positive     Phencyclidine Negative     Creatinine, Random Ur 104.0 23.0 - 375.0 mg/dL    Toxicology Information SEE COMMENT    POCT BREATH ALCOHOL TEST    Collection Time: 07/03/19 10:20 AM   Result Value Ref Range    Breath Alcohol 0.000      ASSESSMENT:     Rosendo Riojas Jr. is a 23 y.o. male with no known past psychiatric hx, who presented to [facility] IOP on 6/20/19  following hospitalization at [facility] for dino in the setting of polysubstance use, including cocaine, THC (possibly synthetics), MDMA, LSD. Pt cleared quickly with abstinence and low dose risperidone. Pt reports escalating use over time, interference with school and relationships, use in hazardous situations. No prior addiction treatment, pt presents as motivated to engage in treatment and achieve sobriety at this time, would be at elevated risk of relapse without adequate support and structure. No longer on antipsychotic, presenting without symptoms of psychosis or dino, per family appears to be at baseline. UDS has remained + for THC, though available confirmation levels have been trending down.  Meets criteria for IOP level of care.      IMPRESSION  Cannabis Use Disorder Severe  Hallucinogen Use Disorder moderate-severe  Alcohol Use Disorder Moderate  Stimulant Use Disorder Moderate  Substance Induced Mood D/O, r/o bipolar d/o    PLAN:     · Cont patient on ABU protocol.  · Breathalyzer daily and urine toxicology at least three times per week. Follow THC levels.   · Patient counseled on abstinence from drugs, alcohol, and non-prescribed medications.     Medications:   · Will continue to assess addictive symptoms, including cravings. Can consider naltrexone, acamprosate, or gabapentin for treatment of addiction, though no medications available with evidence base for treatment of cannabis use d/o, stimulant use d/o, or hallucinogen use  d/o.   · Continue to hold Risperdal, patient has not taken since d/c from [facility]. No overt evidence of psychotic or manic symptoms with abstinence from substances, though will monitor closely and can restart if needed, pt has prescription.     Status: Continue treatment on ABU     This case was seen and discussed with attending psychiatrist MD Sandra Castano MD      Staff note : I, Rubin Sainz MD have personally seen and evaluated the patient on 7-8-19  , and reviewed the above history and exam. I agree and concur with the current assessment and plan.

## 2019-07-08 NOTE — PLAN OF CARE
07/08/19 1000   Activity/Group Therapy Checklist   Group Educational  (healthy anger )   Attendance Attended   Follows Direction Followed directions   Group Interactions/Observations Interacted appropriately   Affect/Mood Range Normal range   Affect/Mood Display Appropriate   Goal Progression Progressing

## 2019-07-08 NOTE — PROGRESS NOTES
Group Psychotherapy (PhD/LCSW)    Site: [facility]    Clinical status of patient: Intensive Outpatient Program (IOP)    Date: 7/8/2019    Group Focus: DBT-Based Group Psychotherapy    Length of service: 46962 - 45-50 minutes    Number of patients in attendance: 12    Referred by: Addictive Behavior Unit Treatment Team    Target symptoms: Substance Abuse    Patient's response to treatment: Active Listening; Self-Disclosure    Progress toward goals: Progressing adequately    Interval History: Session focus was Interpersonal Effectiveness:  DEAR MAN.  Patients were encouraged to exercise skillfulness during interactions by using this framework.    Diagnosis: Synthetic cannabinoid abuse    Plan: Continue treatment on ABU

## 2019-07-09 ENCOUNTER — HOSPITAL ENCOUNTER (OUTPATIENT)
Dept: PSYCHIATRY | Facility: HOSPITAL | Age: 24
Discharge: HOME | End: 2019-07-09
Attending: PSYCHIATRY & NEUROLOGY
Payer: COMMERCIAL

## 2019-07-09 DIAGNOSIS — F19.10 SYNTHETIC CANNABINOID ABUSE: Primary | ICD-10-CM

## 2019-07-09 DIAGNOSIS — F12.10 CANNABIS ABUSE: ICD-10-CM

## 2019-07-09 LAB
BREATH ALCOHOL: 0
THC CONFIRMATION CHAIN OF CUSTODY: NORMAL
THC UR-MCNC: 310 NG/ML
TOXICOLOGIST REVIEW: NORMAL

## 2019-07-09 PROCEDURE — 90853 GROUP PSYCHOTHERAPY: CPT | Performed by: SOCIAL WORKER

## 2019-07-09 PROCEDURE — 90853 GROUP PSYCHOTHERAPY: CPT | Mod: ,,, | Performed by: PSYCHOLOGIST

## 2019-07-09 PROCEDURE — 90853 PR GROUP PSYCHOTHERAPY: ICD-10-PCS | Mod: ,,, | Performed by: PSYCHOLOGIST

## 2019-07-09 PROCEDURE — 90853 GROUP PSYCHOTHERAPY: CPT

## 2019-07-09 NOTE — PROGRESS NOTES
Group Psychotherapy (PhD/LCSW)    Site: [facility]    Clinical status of patient: Intensive Outpatient Program (IOP)    Date: 7/9/2019    Group Focus: Psychodynamic Group Psychotherapy    Length of service: 51240 - 45-50 minutes    Number of patients in attendance: 10    Referred by: Addictive Behavior Unit Treatment Team    Target symptoms: Substance Abuse    Patient's response to treatment: Active Listening and Self-disclosure    Progress toward goals: Progressing adequately    Interval History: Shared his story with new group members.    Diagnosis: synthetic cannabinoid abuse    Plan: Continue treatment on ABU

## 2019-07-09 NOTE — PROGRESS NOTES
Group Psychotherapy (PhD/LCSW)    Site: [facility]    Clinical status of patient: Intensive Outpatient Program (IOP)    Date: 7/9/2019    Group Focus: DBT-Based Group Psychotherapy    Length of service: 28892 - 45-50 minutes    Number of patients in attendance: 12    Referred by: Addictive Behavior Unit Treatment Team    Target symptoms: Substance Abuse    Patient's response to treatment: Active Listening; Self-Disclosure    Progress toward goals: Progressing adequately    Interval History: Session focus was Interpersonal Effectiveness:  DEAR MAN (Part 2).  Patients were encouraged to exercise skillfulness during interactions by using this framework.    Diagnosis: Synthetic cannabinoid abuse    Plan: Continue treatment on ABU

## 2019-07-09 NOTE — PROGRESS NOTES
Group Psychotherapy (PhD/LCSW)    Site: [facility]    Clinical status of patient: Intensive Outpatient Program (IOP)    Date: 7/9/2019    Group Focus: Disease Model of Addiction      Length of service: 43883 - 45-50 minutes    Number of patients in attendance: 10    Referred by: Addictive Behavior Unit Treatment Team    Target symptoms: Substance Abuse    Patient's response to treatment: Active Listening      Progress toward goals: Progressing adequately    Interval History: Discussed basic neuropsychological concepts of the Disease Model of Addiction and how they relate to the phenomena of addiction (eg, powerlessness; euphoric recall; cravings; tolerance; relapse; etc). Noted the way the Disease Model comports with 12-step principles and practices. Emphasized the value of understanding the Disease Model for sustaining long-term sobriety.     Diagnosis: synthetic cannabinoid abuse    Plan: Continue treatment on ABU

## 2019-07-09 NOTE — PLAN OF CARE
07/09/19 1400   Activity/Group Therapy Checklist   Group Goals/Reflection   Attendance Attended   Follows Direction Followed directions   Group Interactions/Observations Interacted appropriately   Affect/Mood Range Normal range   Affect/Mood Display Appropriate   Goal Progression Progressing

## 2019-07-10 LAB — ETHYL GLUCURONIDE: NEGATIVE

## 2019-07-10 NOTE — PATIENT CARE CONFERENCE
ABU Staffing:    Cannabis Use Disorder Severe  Hallucinogen Use Disorder moderate-severe  Alcohol Use Disorder Moderate  Stimulant Use Disorder Moderate  Substance Induced Mood D/O, r/o bipolar d/o        1. Pt is attending all groups    2. Pt is attending all meetings  3. Pt 's has minimally supportive family  4. Pt has completed spiritual assessment    5. Pt will present life story    6. Pt will present Step One assignment    7. Pt is exploring issues related to relapse  prevention; spirituality; stress management; improved communication skills; assertiveness training; poor self-esteem; disease concepts; cross addictions; and, work related issues    8. D/C date: TBD     Staff discussed pt's marijuana levels increasing per THC confirmation test confirming relapse. Staffing discussed pt's lack of participation in groups as well as poor insight into addiction. Staffing discussed pt's mother attending family day for check ins weekly. Staffing discussed pt's passive aggressive behavior and a possible recommendation for a higher level of care.       Problem: Cannabis Use Disorder Severe  Goal: Address in 12 step meetings and group and individual sessions    Objective Measure: participation in groups, self report, length of sobriety, and relapse prevention plan  Time: Prior to discharge    Progress: Pt is attending groups and sessions       Problem: Hallucinogen Use Disorder moderate-severe  Goal: Address in 12 step meetings and group and individual sessions    Objective Measure: participation in groups, self report, length of sobriety, and relapse prevention plan  Time: Prior to discharge    Progress: Pt is attending groups and sessions       Problem: Alcohol Use Disorder Moderate  Goal: Address in 12 step meetings and group and individual sessions    Objective Measure: participation in groups, self report, length of sobriety, and relapse prevention plan  Time: Prior to discharge    Progress: Pt is attending groups and  sessions       Problem: Stimulant Use Disorder Moderate  Goal: Address in 12 step meetings and group and individual sessions    Objective Measure: participation in groups, self report, length of sobriety, and relapse prevention plan  Time: Prior to discharge    Progress: Pt is attending groups and sessions       Problem: Substance Induced Mood D/O, r/o bipolar d/o  Goal: Address in 12 step meetings and group and individual sessions    Objective Measure: participation in groups, self report, length of sobriety, and relapse prevention plan  Time: Prior to discharge    Progress: Pt is attending groups and sessions          Staff members present:   Dr. Bautista, Resident  Dr. Delgadillo, Ph.D  Muriel Mcgowan, LCSW  Hai Montoya, LCSW  Tobi Murphy, LCSW  Oumou Kennedy RN

## 2019-07-11 ENCOUNTER — HOSPITAL ENCOUNTER (OUTPATIENT)
Dept: PSYCHIATRY | Facility: HOSPITAL | Age: 24
Discharge: HOME | End: 2019-07-11
Attending: PSYCHIATRY & NEUROLOGY
Payer: COMMERCIAL

## 2019-07-11 DIAGNOSIS — F12.20 CANNABIS USE DISORDER, SEVERE, DEPENDENCE: ICD-10-CM

## 2019-07-11 DIAGNOSIS — F12.10 CANNABIS ABUSE: ICD-10-CM

## 2019-07-11 DIAGNOSIS — F10.20 ALCOHOL USE DISORDER, MODERATE, DEPENDENCE: ICD-10-CM

## 2019-07-11 DIAGNOSIS — F19.10 SYNTHETIC CANNABINOID ABUSE: Primary | ICD-10-CM

## 2019-07-11 DIAGNOSIS — F15.90 STIMULANT USE DISORDER: ICD-10-CM

## 2019-07-11 LAB
AMPHET+METHAMPHET UR QL: NEGATIVE
BARBITURATES UR QL SCN>200 NG/ML: NEGATIVE
BENZODIAZ UR QL SCN>200 NG/ML: NEGATIVE
BREATH ALCOHOL: 0
BZE UR QL SCN: NEGATIVE
CANNABINOIDS UR QL SCN: NORMAL
CREAT UR-MCNC: 166 MG/DL (ref 23–375)
ETHANOL UR-MCNC: <10 MG/DL
METHADONE UR QL SCN>300 NG/ML: NEGATIVE
OPIATES UR QL SCN: NEGATIVE
PCP UR QL SCN>25 NG/ML: NEGATIVE
TOXICOLOGY INFORMATION: NORMAL

## 2019-07-11 PROCEDURE — 90853 PR GROUP PSYCHOTHERAPY: ICD-10-PCS | Mod: ,,, | Performed by: PSYCHOLOGIST

## 2019-07-11 PROCEDURE — 80349 CANNABINOIDS NATURAL: CPT

## 2019-07-11 PROCEDURE — 99233 PR SUBSEQUENT HOSPITAL CARE,LEVL III: ICD-10-PCS | Mod: ,,, | Performed by: PSYCHIATRY & NEUROLOGY

## 2019-07-11 PROCEDURE — 90853 GROUP PSYCHOTHERAPY: CPT | Performed by: SOCIAL WORKER

## 2019-07-11 PROCEDURE — 80307 DRUG TEST PRSMV CHEM ANLYZR: CPT

## 2019-07-11 PROCEDURE — 90853 GROUP PSYCHOTHERAPY: CPT | Mod: ,,, | Performed by: PSYCHOLOGIST

## 2019-07-11 PROCEDURE — 99233 SBSQ HOSP IP/OBS HIGH 50: CPT | Mod: ,,, | Performed by: PSYCHIATRY & NEUROLOGY

## 2019-07-11 PROCEDURE — 90853 GROUP PSYCHOTHERAPY: CPT

## 2019-07-11 NOTE — PROGRESS NOTES
Group Psychotherapy (PhD/LCSW)    Site: [facility]    Clinical status of patient: Intensive Outpatient Program (IOP)    Date: 7/11/2019    Group Focus: Psychodynamic Group Psychotherapy    Length of service: 81584 - 45-50 minutes    Number of patients in attendance: 10    Referred by: Addictive Behavior Unit Treatment Team    Target symptoms: Substance Abuse    Patient's response to treatment: Active Listening and Self-disclosure    Progress toward goals: Progressing slowly    Interval History: Unwilling to discuss much in group. Poor eye contact.    Diagnosis: synthetic cannabinoid abuse    Plan: Continue treatment on ABU

## 2019-07-11 NOTE — PROGRESS NOTES
Group Psychotherapy (PhD/LCSW)    Site: [facility]    Clinical status of patient: Intensive Outpatient Program (IOP)    Date: 7/11/2019    Group Focus: DBT-Based Group Psychotherapy    Length of service: 88490 - 45-50 minutes    Number of patients in attendance: 13    Referred by: Addictive Behavior Unit Treatment Team    Target symptoms: Substance Abuse    Patient's response to treatment: Active Listening; Self-Disclosure    Progress toward goals: Progressing adequately    Interval History: Session focus was Interpersonal Effectiveness:  DEAR MAN, GIVE, FAST (Part 2).  Patients reviewed this framework and filled out a worksheet for a personal interaction.  Patients were provided with opportunities to collaborate with others and role-play in session.    Diagnosis: Synthetic cannabinoid abuse    Plan: Continue treatment on ABU

## 2019-07-11 NOTE — PLAN OF CARE
07/11/19 1400   Activity/Group Therapy Checklist   Group Goals/Reflection   Attendance Attended   Group Interactions/Observations Did not interact

## 2019-07-11 NOTE — PROGRESS NOTES
"2019 10:22 AM  Name: Rosendo Riojas Jr.  : 1995  Start Date: 19    [facility] Intensive Outpatient Program   Progress Note    Status: Intensive Outpatient Program (IOP)    HPI:  Rosendo Riojas Jr. is a 23 y.o. male with no prior psych history, who presented to [facility] IOP due to polysubstance use and recent admission to [facility] for substance induced dino . Per review of [facility] d/c summary, pt presented as manic and grandiose in the setting of recent THC, cocaine, and ecstasy use, cleared quickly during admission and was referred to IOP.      Pt reports that he was at [facility] from doing "stupid things," says he was using drugs and alcohol, cocaine, LSD, alcohol, MDMA, cannabis. Had been feeling sick for about a week, throwing up, not thinking clearly. Hospitalized for a few days, says that he is thinking more clearly, feels a lot better physically.      Reports that he has been using for a few years. Started with weed, then tried LSD. Sometimes xanax, sometimes cocaine. Was selling weed for a while but stopped last year. Notes that substance use has interfered with school work and relationships. Has never tried to stop before, though has tried to cut back unsuccessfully. Never been to treatment.      Marijuana: Had slowed down recently, but had been smoking every day. Started smoking at 18. Feels that it "changed the way [he] viewed things. Got in the way of school/work, affected productivity ,caused conflict. Didn't intentionally smoke synthetics, says that they scared him.      Cocaine: Started a few years ago, initially started as something he would do occasionally, once at a festival, here and there, then became a little more frequent, maybe 1x/month. Never tried crack     LSD: First tried sometime last year or the year before. Began microdosing once he realized he was using too much, would use 1-2x/month. Started microdosing last year, would use half a tab. Liked the openness it gave him, but found that when he " "focused on something bad, it would get worse.      MDMA: Favorite, likes the creativity that he gave him a lot of creativity, would use a couple of times a month, combine with acid.      Opioids: Never tried heroin, tried oxycodone once, made him feel very sick.      Xanax: When other people around him had it, every few months, didn't really like it, made him feel apathetic.      Alcohol: First beer at a party in . Last couple of semesters was drinking pretty heavily, vodka, could go through a handle in a few days-weeks. Beer occasionally, when chilling with friends. ETOH would heighten certain emotions, like anger.        SUBJECTIVE:     Interval Hx:  6/24/19  Pt reports doing pretty well. Had a good weekend, got good sleep. Spent time with family. Denies cravings, said he felt like he got a "natrual high' off doing "normal things" with his family. Sleeping well, going to bed around 10pm. Mood pretty good. Denies use. Going to meetings. Has not started back on risperdal, mom hasn't picked it up, unsure if he needs it.     Toxicology Screen: UDS 6/21 +THC, confirmation pending, Breath ETOH 6/21 Neg   Medication Side Effects: None  Cravings: no  No other acute psychiatric issues reported at this time.    6/26/19  Pt reports doing pretty well. Mood has been good, does feel bored at times which will lead to feeling frustrated, reports that when he feels this way, will work on art, listen to music, exercise, and that these things help him a lot. Groups have been helpful, does express concern about another peer who has been bringing up self-injury in groups, Ro finds this distressing. Otherwise finds program helpful.  Going to AA meetings on the [facility], likes them. Denies use or cravings. Reports thinking feels clear again. Sleeping well, bed around 11-12, sleeps through the night. Eating well. No SI. Did not restart risperdal since hospitalization, not feeling he needs it (consistent with family report yesterday). " No other concerns.     Toxicology Screen: UDS 6/24 +THC, 6/21 + THC confirmation pending, 6/20 + THC  Level 64 Breath ETOH 6/26, 6/24 Neg, ETOH Biomarkers 6/24: Neg  Medication Side Effects: None  Cravings: no  No other acute psychiatric issues reported at this time.    6/27/19  Pt reports doing well. Enjoying groups. Mood has been good, denies any irritability, agitation, or strange experiences. Sleeping well. Denies NM's. Not taking risperdal, hasn't felt he needed it. Going to meetings every day, found a sponsor named Parrish. No cravings. Getting along well with family. Denies any other concerns.     Toxicology Screen: UDS 6/26 +THC 6/24 +THC, 6/21 + THC Level 50 6/20 + THC  Level 64 Breath ETOH 6/26, 6/27 Neg, ETOH Biomarkers 6/24: Neg  Medication Side Effects: None  Cravings: no  No other acute psychiatric issues reported at this time.    7/1/19  Patient did well through the weekend, went to meetings, did not experience cravings or relapses. Mood has been good, sleeping well without any sleep aids. Patient would like to know the date of his last day in the program. No other questions/concerns today.    Toxicology Screen: UDS 6/28 +THC 6/26 +THC 6/24 +THC, 6/21 + THC Level 50 6/20 + THC  Level 64 Breath ETOH 6/26, 6/27, 7/1 Neg, ETOH Biomarkers 6/24: Neg  Medication Side Effects: None  Cravings: no  No other acute psychiatric issues reported at this time.    7/3/19  Patient doing well this morning, reports benefiting from the program. Denies any cravings. Goes to meetings in the mornings before coming to [facility] so he can have the evening free to relax. Plans to go to [place] at aunt's house tomorrow for 4th of July. No other issues or concerns this morning.    Toxicology Screen: UDS 7/1 +THC UDS 6/28 +THC 6/26 +THC 6/24 +THC, 6/21 + THC Level 50 6/20 + THC  Level 64 Breath ETOH 6/26, 6/27, 7/1, 7/3 Neg, ETOH Biomarkers 6/24: Neg  Medication Side Effects: None  Cravings: no  No other acute psychiatric issues reported  "at this time.    7/8/19  Patient doing well, had a good 4th of July and a good weekend. No cravings, has not had the need to call his sponsor but knows he's available if he needs to. Has been attending AA meetings. No other issues this morning.    Toxicology Screen: UDS 7/3 +THC UDS 6/28 +THC 6/26 +THC 6/24 +THC, 6/21 + THC Level 50 6/20 + THC  Level 64 Breath ETOH 6/26, 6/27, 7/1, 7/3 Neg, ETOH Biomarkers 6/24: Neg  Medication Side Effects: None  Cravings: no  No other acute psychiatric issues reported at this time.    7/11/19  Rosendo is doing well today, reports no issues or complaints. He was made aware of his +THC screen and the fact that his levels had gone up between 7/1 and 7/3. He denies having smoked or having had any edibles or other THC-containing products. He is focused on discharge, but understands his UDS needs to be negative x3 before a discharge date can be brought into the discussion. Denies cravings. Has been attending meetings daily in the mornings before coming to [facility].    Toxicology Screen: UDS 7/3 +THC (THC GC/, up from 33 on 7/1); Breath EtOH 7/11 Neg, EtOH Biomarkers 7/8 Neg  Medication Side Effects: None  Cravings: no  No other acute psychiatric issues reported at this time.    Scheduled Meds:   No current outpatient medications on file prior to encounter.     No current facility-administered medications on file prior to encounter.      Allergies:  Patient has no known allergies.    Psychiatric Review Of Systems:  Denies: Awake, alert  Behavior/Cooperation: normal, cooperative  Psychomotor: unremarkable   Speech: normal tone, normal rate, normal pitch, normal volume  Language: english, fluid  Orientation: grossly intact  Attention Span/Concentration: intact  Memory: Grossly intact  Mood: "Pretty good"  Affect: normal, calm  Thought Process: linear, normal and logical  Associations: normal and logical  Thought Content: normal, no suicidality, no homicidality, delusions, or paranoia  Fund " of Knowledge: Aware of current events  Abstraction: Grossly intact  Insight: fair  Judgment: Intact    Laboratory:  Recent Results (from the past 168 hour(s))   THC , urine, confirmation    Collection Time: 07/05/19 11:08 AM   Result Value Ref Range    THC Conf Chain of Custody Test Not Performed     THC GC/MS Conf 310 Cutoff: 3.0 ng/mL    THC Conf. Interp. Positive.    Toxicology screen, urine    Collection Time: 07/08/19 10:33 AM   Result Value Ref Range    Alcohol, Urine <10 <10 mg/dL    Benzodiazepines Negative     Methadone metabolites Negative     Cocaine (Metab.) Negative     Opiate Scrn, Ur Negative     Barbiturate Screen, Ur Negative     Amphetamine Screen, Ur Negative     THC Negative     Phencyclidine Negative     Creatinine, Random Ur 108.0 23.0 - 375.0 mg/dL    Toxicology Information SEE COMMENT    Alcohol Biomarkers, urine    Collection Time: 07/08/19 10:33 AM   Result Value Ref Range    Ethyl Glucuronide NEGATIVE    POCT BREATH ALCOHOL TEST    Collection Time: 07/09/19 11:56 AM   Result Value Ref Range    Breath Alcohol 0.000    POCT BREATH ALCOHOL TEST    Collection Time: 07/11/19 11:43 AM   Result Value Ref Range    Breath Alcohol 0.000      ASSESSMENT:     Rosendo Riojas Jr. is a 23 y.o. male with no known past psychiatric hx, who presented to [facility] IOP on 6/20/19  following hospitalization at [facility] for dino in the setting of polysubstance use, including cocaine, THC (possibly synthetics), MDMA, LSD. Pt cleared quickly with abstinence and low dose risperidone. Pt reports escalating use over time, interference with school and relationships, use in hazardous situations. No prior addiction treatment, pt presents as motivated to engage in treatment and achieve sobriety at this time, would be at elevated risk of relapse without adequate support and structure. No longer on antipsychotic, presenting without symptoms of psychosis or dino, per family appears to be at baseline. UDS has remained + for THC,and  recent confirmation levels have gone up. Patient denies use. Meets criteria for IOP level of care.      IMPRESSION  Cannabis Use Disorder Severe  Hallucinogen Use Disorder moderate-severe  Alcohol Use Disorder Moderate  Stimulant Use Disorder Moderate  Substance Induced Mood D/O, r/o bipolar d/o    PLAN:     · Cont patient on ABU protocol.  · Breathalyzer daily and urine toxicology at least three times per week. Continue to follow THC levels.   · Patient counseled on abstinence from drugs, alcohol, and non-prescribed medications.     Medications:   · Will continue to assess addictive symptoms, including cravings. Can consider naltrexone, acamprosate, or gabapentin for treatment of addiction, though no medications available with evidence base for treatment of cannabis use d/o, stimulant use d/o, or hallucinogen use d/o.   · Continue to hold Risperdal, patient has not taken since d/c from [facility]. No overt evidence of psychotic or manic symptoms with abstinence from substances, though will monitor closely and can restart if needed, pt has prescription.     Status: Continue treatment on ABU     This case was seen and discussed with attending psychiatrist MD Sandra Castano MD      Staff note : I, Rubin Sainz MD have personally seen and evaluated the patient on 7-11-19  , and reviewed the above history and exam. I agree and concur with the current assessment and plan.

## 2019-07-11 NOTE — PLAN OF CARE
07/11/19 1000   Activity/Group Therapy Checklist   Group Relapse Prevention  (Step Work )   Attendance Attended   Follows Direction Followed directions   Group Interactions/Observations Interacted appropriately   Affect/Mood Range Normal range   Affect/Mood Display Appropriate   Goal Progression Progressing

## 2019-07-11 NOTE — PROGRESS NOTES
"Pt mother and father arrived to unit reporting concern with pt urine screen. SW met with parents about concern. Parents reported receiving a call from pt brother reporting that pt had + urine screen. Parents identified that pt had not informed them of screening but wanted to speak with a  staff member about + screening. Mother expressed concern with why + screen had not been communicated to her. Mother reported, " He lives with us so we should know about this."  Sw discussed pt meeting with MD. Mother and father expressed wanting an update from MD.    Mother added that pt reported repetitive subjects discussed in group. SW discussed treatment plan, addiction education, and psycho educational themes reviewed in daily groups. Mother expressed understanding and reported that she will engage pt in being more descriptive about skills learned in group.   "

## 2019-07-12 ENCOUNTER — HOSPITAL ENCOUNTER (OUTPATIENT)
Dept: PSYCHIATRY | Facility: HOSPITAL | Age: 24
Discharge: HOME | End: 2019-07-12
Attending: PSYCHIATRY & NEUROLOGY
Payer: COMMERCIAL

## 2019-07-12 VITALS — RESPIRATION RATE: 16 BRPM | HEART RATE: 83 BPM | SYSTOLIC BLOOD PRESSURE: 133 MMHG | DIASTOLIC BLOOD PRESSURE: 72 MMHG

## 2019-07-12 DIAGNOSIS — F19.10 SYNTHETIC CANNABINOID ABUSE: Primary | ICD-10-CM

## 2019-07-12 DIAGNOSIS — F12.10 CANNABIS ABUSE: ICD-10-CM

## 2019-07-12 PROBLEM — F15.90 STIMULANT USE DISORDER: Status: ACTIVE | Noted: 2019-07-12

## 2019-07-12 PROBLEM — F12.20 CANNABIS USE DISORDER, SEVERE, DEPENDENCE: Status: ACTIVE | Noted: 2019-07-12

## 2019-07-12 PROBLEM — F10.20 ALCOHOL USE DISORDER, MODERATE, DEPENDENCE: Status: ACTIVE | Noted: 2019-07-12

## 2019-07-12 LAB
AMPHET+METHAMPHET UR QL: NEGATIVE
BARBITURATES UR QL SCN>200 NG/ML: NEGATIVE
BENZODIAZ UR QL SCN>200 NG/ML: NEGATIVE
BREATH ALCOHOL: 0
BZE UR QL SCN: NEGATIVE
CANNABINOIDS UR QL SCN: NORMAL
CREAT UR-MCNC: 193 MG/DL (ref 23–375)
ETHANOL UR-MCNC: <10 MG/DL
METHADONE UR QL SCN>300 NG/ML: NEGATIVE
OPIATES UR QL SCN: NEGATIVE
PCP UR QL SCN>25 NG/ML: NEGATIVE
TOXICOLOGY INFORMATION: NORMAL

## 2019-07-12 PROCEDURE — 90853 GROUP PSYCHOTHERAPY: CPT | Mod: ,,, | Performed by: PSYCHOLOGIST

## 2019-07-12 PROCEDURE — 90853 PR GROUP PSYCHOTHERAPY: ICD-10-PCS | Mod: ,,, | Performed by: PSYCHOLOGIST

## 2019-07-12 PROCEDURE — 90853 GROUP PSYCHOTHERAPY: CPT

## 2019-07-12 PROCEDURE — 80307 DRUG TEST PRSMV CHEM ANLYZR: CPT

## 2019-07-12 PROCEDURE — 80349 CANNABINOIDS NATURAL: CPT

## 2019-07-12 NOTE — PLAN OF CARE
07/12/19 1100   Activity/Group Therapy Checklist   Group Educational  (Mindfulness)   Attendance Attended   Follows Direction Followed directions   Group Interactions/Observations Interacted appropriately;Alert  (Needed prompting to share and would provide minimal input when sharing. Identified using mindfulness skill in coping with recent relationship)   Affect/Mood Range Normal range   Affect/Mood Display Appropriate   Goal Progression Progressing

## 2019-07-13 NOTE — PROGRESS NOTES
Group Psychotherapy (PhD/LCSW)    Site: [facility]    Clinical status of patient: Intensive Outpatient Program (IOP)    Date: 7/12/2019    Group Focus: Psychodynamic Group Psychotherapy    Length of service: 87128 - 45-50 minutes    Number of patients in attendance: 9    Referred by: Addictive Behavior Unit Treatment Team    Target symptoms: Substance Abuse    Patient's response to treatment: Active Listening and Self-disclosure    Progress toward goals: Progressing slowly    Interval History: Discussed having to cultivate patience by being in the program when he feels he does not need to be in it because he has learned all he needs to know. He denies cravings.     Diagnosis: synthetic cannabinoid abuse    Plan: Continue treatment on ABU

## 2019-07-15 ENCOUNTER — HOSPITAL ENCOUNTER (OUTPATIENT)
Dept: PSYCHIATRY | Facility: HOSPITAL | Age: 24
Discharge: HOME | End: 2019-07-15
Attending: PSYCHIATRY & NEUROLOGY
Payer: COMMERCIAL

## 2019-07-15 VITALS — DIASTOLIC BLOOD PRESSURE: 65 MMHG | RESPIRATION RATE: 6 BRPM | SYSTOLIC BLOOD PRESSURE: 133 MMHG | HEART RATE: 83 BPM

## 2019-07-15 DIAGNOSIS — F12.10 CANNABIS ABUSE: ICD-10-CM

## 2019-07-15 DIAGNOSIS — F19.10 SYNTHETIC CANNABINOID ABUSE: Primary | ICD-10-CM

## 2019-07-15 LAB
AMPHET+METHAMPHET UR QL: NEGATIVE
BARBITURATES UR QL SCN>200 NG/ML: NEGATIVE
BENZODIAZ UR QL SCN>200 NG/ML: NEGATIVE
BREATH ALCOHOL: 0
BZE UR QL SCN: NEGATIVE
CANNABINOIDS UR QL SCN: NORMAL
CREAT UR-MCNC: 269 MG/DL (ref 23–375)
ETHANOL UR-MCNC: <10 MG/DL
METHADONE UR QL SCN>300 NG/ML: NEGATIVE
OPIATES UR QL SCN: NEGATIVE
PCP UR QL SCN>25 NG/ML: NEGATIVE
TOXICOLOGY INFORMATION: NORMAL

## 2019-07-15 PROCEDURE — 90853 GROUP PSYCHOTHERAPY: CPT | Mod: 59,,, | Performed by: PSYCHOLOGIST

## 2019-07-15 PROCEDURE — 99232 PR SUBSEQUENT HOSPITAL CARE,LEVL II: ICD-10-PCS | Mod: ,,, | Performed by: PSYCHIATRY & NEUROLOGY

## 2019-07-15 PROCEDURE — 90853 GROUP PSYCHOTHERAPY: CPT

## 2019-07-15 PROCEDURE — 90853 PR GROUP PSYCHOTHERAPY: ICD-10-PCS | Mod: 59,,, | Performed by: PSYCHOLOGIST

## 2019-07-15 PROCEDURE — 80307 DRUG TEST PRSMV CHEM ANLYZR: CPT

## 2019-07-15 PROCEDURE — 80349 CANNABINOIDS NATURAL: CPT

## 2019-07-15 PROCEDURE — 99232 SBSQ HOSP IP/OBS MODERATE 35: CPT | Mod: ,,, | Performed by: PSYCHIATRY & NEUROLOGY

## 2019-07-15 PROCEDURE — 90853 GROUP PSYCHOTHERAPY: CPT | Performed by: SOCIAL WORKER

## 2019-07-15 NOTE — PROGRESS NOTES
Group Psychotherapy (PhD/LCSW)    Site: [facility]    Clinical status of patient: Intensive Outpatient Program (IOP)    Date: 7/15/2019    Group Focus: Psychodynamic Group Psychotherapy    Length of service: 88498 - 45-50 minutes    Number of patients in attendance: 9    Referred by: Addictive Behavior Unit Treatment Team    Target symptoms: Substance Abuse    Patient's response to treatment: Active Listening and Self-disclosure    Progress toward goals: Progressing slowly    Interval History: Pt reported a quiet weekend doing homework. Denies problems maintaining abstinence.     Diagnosis: synthetic cannabinoid abuse    Plan: Continue treatment on ABU

## 2019-07-15 NOTE — PROGRESS NOTES
Group Psychotherapy (PhD/LCSW)    Site: [facility]    Clinical status of patient: Intensive Outpatient Program (IOP)    Date: 7/15/2019    Group Focus: Communication Skills       Length of service: 90598 - 45-50 minutes    Number of patients in attendance: 12    Referred by: Addictive Behavior Unit Treatment Team    Target symptoms: Substance Abuse    Patient's response to treatment: Active Listening and Self-disclosure    Progress toward goals: Progressing slowly    Interval History: Discussed basic communication skills (I-messages; Active Listening): Illustrated their value and use through modeling, role play, and vignettes..     Diagnosis: synthetic cannabinoid abuse    Plan: Continue treatment on ABU

## 2019-07-15 NOTE — PLAN OF CARE
07/15/19 1000   Activity/Group Therapy Checklist   Group Relapse Prevention   Attendance Attended   Follows Direction Followed directions   Group Interactions/Observations Interacted appropriately   Affect/Mood Range Normal range   Affect/Mood Display Appropriate
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
## 2019-07-15 NOTE — PLAN OF CARE
07/15/19 1400   Activity/Group Therapy Checklist   Group Educational  (1st Step)   Attendance Attended   Follows Direction Followed directions   Group Interactions/Observations Interacted appropriately;Alert  (observed to be distracted. particpated when prompted. writing in notebook notes unrelated to treatment discussion)   Affect/Mood Range Normal range   Affect/Mood Display Appropriate   Goal Progression Progressing  (minimally)

## 2019-07-16 ENCOUNTER — HOSPITAL ENCOUNTER (OUTPATIENT)
Dept: PSYCHIATRY | Facility: HOSPITAL | Age: 24
Discharge: HOME | End: 2019-07-16
Attending: PSYCHIATRY & NEUROLOGY
Payer: COMMERCIAL

## 2019-07-16 DIAGNOSIS — F19.10 SYNTHETIC CANNABINOID ABUSE: Primary | ICD-10-CM

## 2019-07-16 DIAGNOSIS — F12.10 CANNABIS ABUSE: ICD-10-CM

## 2019-07-16 LAB
BREATH ALCOHOL: 0
BREATH ALCOHOL: 0
THC CONFIRMATION CHAIN OF CUSTODY: NORMAL
THC UR-MCNC: 21 NG/ML
TOXICOLOGIST REVIEW: NORMAL

## 2019-07-16 PROCEDURE — 90853 GROUP PSYCHOTHERAPY: CPT | Mod: ,,, | Performed by: PSYCHOLOGIST

## 2019-07-16 PROCEDURE — 90853 GROUP PSYCHOTHERAPY: CPT

## 2019-07-16 PROCEDURE — 90853 PR GROUP PSYCHOTHERAPY: ICD-10-PCS | Mod: ,,, | Performed by: PSYCHOLOGIST

## 2019-07-16 PROCEDURE — 90853 GROUP PSYCHOTHERAPY: CPT | Performed by: SOCIAL WORKER

## 2019-07-16 NOTE — PROGRESS NOTES
Group Psychotherapy (PhD/LCSW)    Site: [facility]    Clinical status of patient: Intensive Outpatient Program (IOP)    Date: 7/16/2019    Group Focus: Disease Model of Addiction      Length of service: 57228 - 45-50 minutes    Number of patients in attendance: 11    Referred by: Addictive Behavior Unit Treatment Team    Target symptoms: Substance Abuse    Patient's response to treatment: Active Listening      Progress toward goals: Progressing slowly    Interval History: Discussed the basic neuropsychological concept of the Disease Model of Addiction and their relationship to the phenomena of addiction (eg, powerlessness; craving; euphoric recall; tolerance; etc). Noted the way the Disease Model comports with the principles and practices of AA. Noted the value of the Disease Model for sustaining long-term sobriety.    Diagnosis: synthetic cannabinoid abuse    Plan: Continue treatment on ABU

## 2019-07-16 NOTE — PROGRESS NOTES
Group Psychotherapy (PhD/LCSW)    Site: [facility]    Clinical status of patient: Intensive Outpatient Program (IOP)    Date: 7/16/2019    Group Focus: Psychodynamic Group Psychotherapy    Length of service: 23040 - 45-50 minutes    Number of patients in attendance: 11    Referred by: Addictive Behavior Unit Treatment Team    Target symptoms: Substance Abuse    Patient's response to treatment: Active Listening and Self-disclosure    Progress toward goals: Progressing slowly    Interval History: Shared his story with new group member.    Diagnosis: synthetic cannabinoid abuse    Plan: Continue treatment on ABU

## 2019-07-16 NOTE — PATIENT CARE CONFERENCE
ABU Staffing:    Cannabis Use Disorder Severe  Hallucinogen Use Disorder moderate-severe  Alcohol Use Disorder Moderate  Stimulant Use Disorder Moderate  Substance Induced Mood D/O, r/o bipolar d/o        1. Pt is attending all groups    2. Pt is attending all meetings  3. Pt 's has minimally supportive family  4. Pt has completed spiritual assessment    5. Pt will present life story    6. Pt will present Step One assignment    7. Pt is exploring issues related to relapse  prevention; spirituality; stress management; improved communication skills; assertiveness training; poor self-esteem; disease concepts; cross addictions; and, work related issues    8. D/C date: TBD     Staff discussed pt's marijuana levels dropping per THC confirmations. Staffing discussed pt's oppositional and sociopathic traits. Staffing discussed pt's parents attending weekly and pt's passive aggressive behavior. Staffing discussed pt needing to engage in groups more and pt's limited insight into addiction. STaffing discussed pt needing 3 negative drug screens prior to d/c.         Problem: Cannabis Use Disorder Severe  Goal: Address in 12 step meetings and group and individual sessions    Objective Measure: participation in groups, self report, length of sobriety, and relapse prevention plan  Time: Prior to discharge    Progress: Pt is attending groups and sessions       Problem: Hallucinogen Use Disorder moderate-severe  Goal: Address in 12 step meetings and group and individual sessions    Objective Measure: participation in groups, self report, length of sobriety, and relapse prevention plan  Time: Prior to discharge    Progress: Pt is attending groups and sessions       Problem: Alcohol Use Disorder Moderate  Goal: Address in 12 step meetings and group and individual sessions    Objective Measure: participation in groups, self report, length of sobriety, and relapse prevention plan  Time: Prior to discharge    Progress: Pt is attending  groups and sessions       Problem: Stimulant Use Disorder Moderate  Goal: Address in 12 step meetings and group and individual sessions    Objective Measure: participation in groups, self report, length of sobriety, and relapse prevention plan  Time: Prior to discharge    Progress: Pt is attending groups and sessions       Problem: Substance Induced Mood D/O, r/o bipolar d/o  Goal: Address in 12 step meetings and group and individual sessions    Objective Measure: participation in groups, self report, length of sobriety, and relapse prevention plan  Time: Prior to discharge    Progress: Pt is attending groups and sessions          Staff members present:   MD Dr. Michele Cowan, Resident  Dr. Delgadillo, Ph.D  Muriel Mcgowan, [facility]  Hai Montoya, [facility]  Tobi Murphy, [facility]  Oumou Kennedy RN

## 2019-07-16 NOTE — PLAN OF CARE
07/16/19 1400   Activity/Group Therapy Checklist   Group Goals/Reflection   Attendance Attended   Follows Direction Followed directions   Group Interactions/Observations Interacted appropriately   Affect/Mood Range Normal range   Affect/Mood Display Appropriate

## 2019-07-17 ENCOUNTER — HOSPITAL ENCOUNTER (OUTPATIENT)
Dept: PSYCHIATRY | Facility: HOSPITAL | Age: 24
Discharge: HOME | End: 2019-07-17
Attending: PSYCHIATRY & NEUROLOGY
Payer: COMMERCIAL

## 2019-07-17 VITALS — RESPIRATION RATE: 16 BRPM | DIASTOLIC BLOOD PRESSURE: 70 MMHG | SYSTOLIC BLOOD PRESSURE: 121 MMHG | HEART RATE: 86 BPM

## 2019-07-17 DIAGNOSIS — F19.10 SYNTHETIC CANNABINOID ABUSE: Primary | ICD-10-CM

## 2019-07-17 DIAGNOSIS — F12.10 CANNABIS ABUSE: ICD-10-CM

## 2019-07-17 LAB
AMPHET+METHAMPHET UR QL: NEGATIVE
BARBITURATES UR QL SCN>200 NG/ML: NEGATIVE
BENZODIAZ UR QL SCN>200 NG/ML: NEGATIVE
BREATH ALCOHOL: 0
BZE UR QL SCN: NEGATIVE
CANNABINOIDS UR QL SCN: NEGATIVE
CREAT UR-MCNC: 211 MG/DL (ref 23–375)
ETHANOL UR-MCNC: <10 MG/DL
METHADONE UR QL SCN>300 NG/ML: NEGATIVE
OPIATES UR QL SCN: NEGATIVE
PCP UR QL SCN>25 NG/ML: NEGATIVE
THC CONFIRMATION CHAIN OF CUSTODY: NORMAL
THC UR-MCNC: 19 NG/ML
TOXICOLOGIST REVIEW: NORMAL
TOXICOLOGY INFORMATION: NORMAL

## 2019-07-17 PROCEDURE — 99232 SBSQ HOSP IP/OBS MODERATE 35: CPT | Mod: ,,, | Performed by: PSYCHIATRY & NEUROLOGY

## 2019-07-17 PROCEDURE — 90853 PR GROUP PSYCHOTHERAPY: ICD-10-PCS | Mod: ,,, | Performed by: PSYCHOLOGIST

## 2019-07-17 PROCEDURE — 99232 PR SUBSEQUENT HOSPITAL CARE,LEVL II: ICD-10-PCS | Mod: ,,, | Performed by: PSYCHIATRY & NEUROLOGY

## 2019-07-17 PROCEDURE — 80307 DRUG TEST PRSMV CHEM ANLYZR: CPT

## 2019-07-17 PROCEDURE — 90853 GROUP PSYCHOTHERAPY: CPT

## 2019-07-17 PROCEDURE — 90853 GROUP PSYCHOTHERAPY: CPT | Mod: ,,, | Performed by: PSYCHOLOGIST

## 2019-07-17 PROCEDURE — 90853 GROUP PSYCHOTHERAPY: CPT | Performed by: SOCIAL WORKER

## 2019-07-17 NOTE — PLAN OF CARE
07/17/19 1400   Activity/Group Therapy Checklist   Group Goals/Reflection   Attendance Attended   Follows Direction Followed directions   Group Interactions/Observations Interacted appropriately   Affect/Mood Range Normal range   Affect/Mood Display Appropriate

## 2019-07-17 NOTE — PROGRESS NOTES
Group Psychotherapy (PhD/LCSW)    Site: [facility]    Clinical status of patient: Intensive Outpatient Program (IOP)    Date: 7/17/2019    Group Focus: Psychodynamic Group Psychotherapy    Length of service: 82202 - 45-50 minutes    Number of patients in attendance: 12    Referred by: Addictive Behavior Unit Treatment Team    Target symptoms: Substance Abuse    Patient's response to treatment: Active Listening and Self-disclosure    Progress toward goals: Progressing slowly    Interval History: Reports staying out of contact with others who bought drugs from him.    Diagnosis: synthetic cannabinoid abuse    Plan: Continue treatment on ABU

## 2019-07-17 NOTE — PROGRESS NOTES
Group Psychotherapy (PhD/LCSW)    Site: [facility]    Clinical status of patient: Intensive Outpatient Program (IOP)    Date: 7/17/2019    Group Focus: ACT Group Psychotherapy    Length of service: 10831 - 45-50 minutes    Number of patients in attendance: 17    Referred by: Addictive Behavior Unit Treatment Team    Target symptoms: Substance Abuse    Patient's response to treatment: Active Listening; Self-Disclosure    Progress toward goals: Progressing adequately    Interval History: Session focus was Commitment to Values.  Patients were encouraged to commit to values in each present moment in order to promote a meaningful life.  They were instructed to stock their value pantry with small and big commitments.    Diagnosis: Synthetic cannabinoid abuse    Plan: Continue treatment on ABU

## 2019-07-17 NOTE — PROGRESS NOTES
Group Psychotherapy (PhD/LCSW)    Site: [facility]    Clinical status of patient: Intensive Outpatient Program (IOP)    Date: 7/17/2019    Group Focus: Stress Management       Length of service: 39682 - 45-50 minutes    Number of patients in attendance: 17    Referred by: Addictive Behavior Unit Treatment Team    Target symptoms: Substance Abuse    Patient's response to treatment: Active Listening     Progress toward goals: Progressing adequately    Interval History: Discussed the role of self-talk and maladaptive beliefs in stress management. Noted strategies for identifying & challenging maladaptive beliefs and changing one's self-talk to enhance one's coping ability    Diagnosis: Synthetic cannabinoid abuse    Plan: Continue treatment on ABU
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
## 2019-07-18 ENCOUNTER — HOSPITAL ENCOUNTER (OUTPATIENT)
Dept: PSYCHIATRY | Facility: HOSPITAL | Age: 24
Discharge: HOME | End: 2019-07-18
Attending: PSYCHIATRY & NEUROLOGY
Payer: COMMERCIAL

## 2019-07-18 DIAGNOSIS — F19.10 SYNTHETIC CANNABINOID ABUSE: Primary | ICD-10-CM

## 2019-07-18 DIAGNOSIS — F12.10 CANNABIS ABUSE: ICD-10-CM

## 2019-07-18 LAB — BREATH ALCOHOL: 0

## 2019-07-18 PROCEDURE — 90853 GROUP PSYCHOTHERAPY: CPT | Performed by: SOCIAL WORKER

## 2019-07-18 PROCEDURE — G0177 OPPS/PHP; TRAIN & EDUC SERV: HCPCS

## 2019-07-18 PROCEDURE — 90853 GROUP PSYCHOTHERAPY: CPT | Mod: ,,, | Performed by: PSYCHOLOGIST

## 2019-07-18 PROCEDURE — 90853 GROUP PSYCHOTHERAPY: CPT

## 2019-07-18 PROCEDURE — 90853 PR GROUP PSYCHOTHERAPY: ICD-10-PCS | Mod: ,,, | Performed by: PSYCHOLOGIST

## 2019-07-18 NOTE — PROGRESS NOTES
Group Psychotherapy (PhD/LCSW)    Site: [facility]    Clinical status of patient: Intensive Outpatient Program (IOP)    Date: 7/18/2019    Group Focus: ACT Group Psychotherapy    Length of service: 00776 - 45-50 minutes    Number of patients in attendance: 17    Referred by: Addictive Behavior Unit Treatment Team    Target symptoms: Substance Abuse    Patient's response to treatment: Active Listening; Self-Disclosure    Progress toward goals: Progressing adequately    Interval History: Session focus was Commitment to Values (Part 2).  Patients were encouraged to write down the ways they committed to values in the schedule.  Each patient shared their commitments to values for the past week.    Diagnosis: Synthetic cannabinoid abuse    Plan: Continue treatment on [facility]

## 2019-07-18 NOTE — PROGRESS NOTES
Group Psychotherapy (PhD/LCSW)    Site: [facility]    Clinical status of patient: Intensive Outpatient Program (IOP)    Date: 7/18/2019    Group Focus: Psychodynamic Group Psychotherapy    Length of service: 05952 - 45-50 minutes    Number of patients in attendance: 11    Referred by: Addictive Behavior Unit Treatment Team    Target symptoms: Substance Abuse    Patient's response to treatment: Active Listening and Self-disclosure    Progress toward goals: Progressing slowly    Interval History: Discussed how two other patients have affected his recovery.    Diagnosis: synthetic cannabinoid abuse    Plan: Continue treatment on [facility]BU

## 2019-07-18 NOTE — PLAN OF CARE
07/18/19 1000   Activity/Group Therapy Checklist   Group Relapse Prevention   Attendance Attended   Follows Direction Followed directions   Group Interactions/Observations Interacted appropriately   Affect/Mood Range Normal range   Affect/Mood Display Appropriate

## 2019-07-19 ENCOUNTER — HOSPITAL ENCOUNTER (OUTPATIENT)
Dept: PSYCHIATRY | Facility: HOSPITAL | Age: 24
Discharge: HOME | End: 2019-07-19
Attending: PSYCHIATRY & NEUROLOGY
Payer: COMMERCIAL

## 2019-07-19 VITALS — RESPIRATION RATE: 16 BRPM | HEART RATE: 88 BPM | SYSTOLIC BLOOD PRESSURE: 165 MMHG | DIASTOLIC BLOOD PRESSURE: 78 MMHG

## 2019-07-19 DIAGNOSIS — F12.10 CANNABIS ABUSE: ICD-10-CM

## 2019-07-19 DIAGNOSIS — F19.10 SYNTHETIC CANNABINOID ABUSE: Primary | ICD-10-CM

## 2019-07-19 LAB
AMPHET+METHAMPHET UR QL: NEGATIVE
BARBITURATES UR QL SCN>200 NG/ML: NEGATIVE
BENZODIAZ UR QL SCN>200 NG/ML: NEGATIVE
BREATH ALCOHOL: 0
BZE UR QL SCN: NEGATIVE
CANNABINOIDS UR QL SCN: NEGATIVE
CREAT UR-MCNC: 168 MG/DL (ref 23–375)
ETHANOL UR-MCNC: <10 MG/DL
METHADONE UR QL SCN>300 NG/ML: NEGATIVE
OPIATES UR QL SCN: NEGATIVE
PCP UR QL SCN>25 NG/ML: NEGATIVE
TOXICOLOGY INFORMATION: NORMAL

## 2019-07-19 PROCEDURE — 80307 DRUG TEST PRSMV CHEM ANLYZR: CPT

## 2019-07-19 PROCEDURE — 90853 PR GROUP PSYCHOTHERAPY: ICD-10-PCS | Mod: ,,, | Performed by: PSYCHOLOGIST

## 2019-07-19 PROCEDURE — 90853 GROUP PSYCHOTHERAPY: CPT | Mod: ,,, | Performed by: PSYCHOLOGIST

## 2019-07-19 PROCEDURE — 99232 SBSQ HOSP IP/OBS MODERATE 35: CPT | Mod: ,,, | Performed by: PSYCHIATRY & NEUROLOGY

## 2019-07-19 PROCEDURE — 90853 GROUP PSYCHOTHERAPY: CPT

## 2019-07-19 PROCEDURE — G0177 OPPS/PHP; TRAIN & EDUC SERV: HCPCS

## 2019-07-19 PROCEDURE — 90853 GROUP PSYCHOTHERAPY: CPT | Performed by: SOCIAL WORKER

## 2019-07-19 PROCEDURE — 99232 PR SUBSEQUENT HOSPITAL CARE,LEVL II: ICD-10-PCS | Mod: ,,, | Performed by: PSYCHIATRY & NEUROLOGY

## 2019-07-19 NOTE — PROGRESS NOTES
Group Psychotherapy (PhD/LCSW)    Site: [facility]    Clinical status of patient: Intensive Outpatient Program (IOP)    Date: 7/19/2019    Group Focus: Stress Management    Length of service: 09683 - 45-50 minutes    Number of patients in attendance: 14    Referred by: Addictive Behavior Unit Treatment Team    Target symptoms: Substance Abuse    Patient's response to treatment: Active Listening    Progress toward goals: Progressing adequately    Interval History: Group learned and practiced mindfulness techniques (extended sensory meditation) to improve present-moment awareness, impulsive behavior tendencies, and tolerance of various emotional states.    Diagnosis: Synthetic Cannabinoid Abuse    Plan: Continue treatment on [facility]BU
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
## 2019-07-19 NOTE — PLAN OF CARE
07/19/19 1400   Activity/Group Therapy Checklist   Group Relapse Prevention   Attendance Attended   Follows Direction Followed directions   Group Interactions/Observations Interacted appropriately   Affect/Mood Range Normal range   Affect/Mood Display Appropriate   Goal Progression Progressing

## 2019-07-19 NOTE — PROGRESS NOTES
Group Psychotherapy (PhD/LCSW)    Site: [facility]    Clinical status of patient: Intensive Outpatient Program (IOP)    Date: 7/19/2019    Group Focus: Psychodynamic Group Psychotherapy    Length of service: 89187 - 45-50 minutes    Number of patients in attendance: 9    Referred by: Addictive Behavior Unit Treatment Team    Target symptoms: Substance Abuse    Patient's response to treatment: Active Listening and Self-disclosure    Progress toward goals: Progressing slowly    Interval History: Discussed his decision to become more actively involved in the ABU groups after realizing that the more he puts into the program, the more he will get out of it.     Diagnosis: synthetic cannabinoid abuse    Plan: Continue treatment on ABU

## 2019-07-20 LAB
THC CONFIRMATION CHAIN OF CUSTODY: NORMAL
THC UR-MCNC: 22 NG/ML
TOXICOLOGIST REVIEW: NORMAL

## 2019-07-22 ENCOUNTER — HOSPITAL ENCOUNTER (OUTPATIENT)
Dept: PSYCHIATRY | Facility: HOSPITAL | Age: 24
Discharge: HOME | End: 2019-07-22
Attending: PSYCHIATRY & NEUROLOGY
Payer: COMMERCIAL

## 2019-07-22 VITALS — DIASTOLIC BLOOD PRESSURE: 70 MMHG | RESPIRATION RATE: 16 BRPM | HEART RATE: 87 BPM | SYSTOLIC BLOOD PRESSURE: 127 MMHG

## 2019-07-22 DIAGNOSIS — F19.10 SYNTHETIC CANNABINOID ABUSE: Primary | ICD-10-CM

## 2019-07-22 DIAGNOSIS — F12.10 CANNABIS ABUSE: ICD-10-CM

## 2019-07-22 LAB
AMPHET+METHAMPHET UR QL: NEGATIVE
BARBITURATES UR QL SCN>200 NG/ML: NEGATIVE
BENZODIAZ UR QL SCN>200 NG/ML: NEGATIVE
BREATH ALCOHOL: 0
BZE UR QL SCN: NEGATIVE
CANNABINOIDS UR QL SCN: NEGATIVE
CREAT UR-MCNC: 133 MG/DL (ref 23–375)
ETHANOL UR-MCNC: <10 MG/DL
METHADONE UR QL SCN>300 NG/ML: NEGATIVE
OPIATES UR QL SCN: NEGATIVE
PCP UR QL SCN>25 NG/ML: NEGATIVE
TOXICOLOGY INFORMATION: NORMAL

## 2019-07-22 PROCEDURE — 90853 GROUP PSYCHOTHERAPY: CPT

## 2019-07-22 PROCEDURE — 80307 DRUG TEST PRSMV CHEM ANLYZR: CPT

## 2019-07-22 PROCEDURE — 90853 PR GROUP PSYCHOTHERAPY: ICD-10-PCS | Mod: 59,,, | Performed by: PSYCHOLOGIST

## 2019-07-22 PROCEDURE — 90853 GROUP PSYCHOTHERAPY: CPT | Performed by: SOCIAL WORKER

## 2019-07-22 PROCEDURE — 90853 GROUP PSYCHOTHERAPY: CPT | Mod: 59,,, | Performed by: PSYCHOLOGIST

## 2019-07-22 NOTE — PROGRESS NOTES
Group Psychotherapy (PhD/LCSW)    Site: [facility]    Clinical status of patient: Intensive Outpatient Program (IOP)    Date: 7/22/2019    Group Focus: Psychodynamic Group Psychotherapy    Length of service: 68445 - 45-50 minutes    Number of patients in attendance: 9    Referred by: Addictive Behavior Unit Treatment Team    Target symptoms: Substance Abuse    Patient's response to treatment: Active Listening and Self-disclosure    Progress toward goals: Progressing slowly    Interval History: Discussed having a good weekend with his friends. Noted that he does not have a problem not using around them. Pt continues to be more actively engaged in the group process.     Diagnosis: synthetic cannabinoid abuse    Plan: Continue treatment on ABU

## 2019-07-22 NOTE — PLAN OF CARE
07/22/19 1400   Activity/Group Therapy Checklist   Group Relapse Prevention   Attendance Attended   Follows Direction Followed directions   Group Interactions/Observations Interacted appropriately;Alert  (Presented 1st step)   Affect/Mood Range Normal range   Affect/Mood Display Appropriate   Goal Progression Progressing

## 2019-07-22 NOTE — PLAN OF CARE
07/22/19 1000   Activity/Group Therapy Checklist   Group Addiction Education   Attendance Attended   Follows Direction Followed directions   Group Interactions/Observations Interacted appropriately   Affect/Mood Range Normal range   Affect/Mood Display Appropriate   Goal Progression Progressing

## 2019-07-23 ENCOUNTER — HOSPITAL ENCOUNTER (OUTPATIENT)
Dept: PSYCHIATRY | Facility: HOSPITAL | Age: 24
Discharge: HOME | End: 2019-07-23
Attending: PSYCHIATRY & NEUROLOGY
Payer: COMMERCIAL

## 2019-07-23 DIAGNOSIS — F19.94 SUBSTANCE INDUCED MOOD DISORDER: Chronic | ICD-10-CM

## 2019-07-23 DIAGNOSIS — F12.20 CANNABIS USE DISORDER, SEVERE, DEPENDENCE: ICD-10-CM

## 2019-07-23 DIAGNOSIS — F10.20 ALCOHOL USE DISORDER, MODERATE, DEPENDENCE: ICD-10-CM

## 2019-07-23 DIAGNOSIS — F19.10 SYNTHETIC CANNABINOID ABUSE: Primary | ICD-10-CM

## 2019-07-23 DIAGNOSIS — F15.90 STIMULANT USE DISORDER: ICD-10-CM

## 2019-07-23 DIAGNOSIS — F12.10 CANNABIS ABUSE: ICD-10-CM

## 2019-07-23 LAB — BREATH ALCOHOL: 0

## 2019-07-23 PROCEDURE — 90853 GROUP PSYCHOTHERAPY: CPT | Mod: ,,, | Performed by: PSYCHOLOGIST

## 2019-07-23 PROCEDURE — 90853 PR GROUP PSYCHOTHERAPY: ICD-10-PCS | Mod: ,,, | Performed by: PSYCHOLOGIST

## 2019-07-23 PROCEDURE — 90853 GROUP PSYCHOTHERAPY: CPT | Mod: 59,,, | Performed by: PSYCHOLOGIST

## 2019-07-23 PROCEDURE — 90853 GROUP PSYCHOTHERAPY: CPT

## 2019-07-23 PROCEDURE — 90853 PR GROUP PSYCHOTHERAPY: ICD-10-PCS | Mod: 59,,, | Performed by: PSYCHOLOGIST

## 2019-07-23 PROCEDURE — 99232 SBSQ HOSP IP/OBS MODERATE 35: CPT | Mod: ,,, | Performed by: PSYCHIATRY & NEUROLOGY

## 2019-07-23 PROCEDURE — 90853 GROUP PSYCHOTHERAPY: CPT | Performed by: SOCIAL WORKER

## 2019-07-23 PROCEDURE — 99232 PR SUBSEQUENT HOSPITAL CARE,LEVL II: ICD-10-PCS | Mod: ,,, | Performed by: PSYCHIATRY & NEUROLOGY

## 2019-07-23 NOTE — PROGRESS NOTES
Group Psychotherapy (PhD/LCSW)    Site: [facility]    Clinical status of patient: Intensive Outpatient Program (IOP)    Date: 7/23/2019    Group Focus: Psychodynamic Group Psychotherapy    Length of service: 30876 - 45-50 minutes    Number of patients in attendance: 10    Referred by: Addictive Behavior Unit Treatment Team    Target symptoms: Substance Abuse    Patient's response to treatment: Active Listening and Self-disclosure    Progress toward goals: Progressing slowly    Interval History: Shared his story with new group member.    Diagnosis: synthetic cannabinoid abuse    Plan: Continue treatment on ABU

## 2019-07-23 NOTE — PROGRESS NOTES
Group Psychotherapy (PhD/LCSW)    Site: [facility]    Clinical status of patient: Intensive Outpatient Program (IOP)    Date: 7/23/2019    Group Focus: Aftercare Group    Length of service: 91896 - 45-50 minutes    Number of patients in attendance: 6    Referred by: Addictive Behavior Unit Treatment Team    Target symptoms: Substance Abuse    Patient's response to treatment: Active Listening and Self-disclosure    Progress toward goals: Progressing slowly    Interval History: Shared his story with the group.    Diagnosis: synthetic cannabinoid abuse    Plan: Continue treatment on ABU

## 2019-07-23 NOTE — PROGRESS NOTES
Group Psychotherapy (PhD/LCSW)    Site: [facility]    Clinical status of patient: Intensive Outpatient Program (IOP)    Date: 7/23/2019    Group Focus:  Disease Model of Addiction      Length of service: 15541 - 45-50 minutes    Number of patients in attendance: 10    Referred by: Addictive Behavior Unit Treatment Team    Target symptoms: Substance Abuse    Patient's response to treatment: Active Listening      Progress toward goals: Progressing slowly    Interval History: Discussed the basic neuropsychological concepts of the Disease Model of Addiction and their relationship to the phenomena of addiction (including powerlessness; tolerance; cravings; euphoric recall; etc). Noted the way the Disease Model comports with 12-step recovery principles and practices. Noted also the value of understanding the Disease Model for sustaining long-term sobriety.     Diagnosis: synthetic cannabinoid abuse    Plan: Continue treatment on ABU

## 2019-07-23 NOTE — PROGRESS NOTES
Group Psychotherapy (PhD/LCSW)    Site: [facility]    Clinical status of patient: Intensive Outpatient Program (IOP)    Date: 7/22/2019    Group Focus: Communication Skills       Length of service: 19962 - 45-50 minutes    Number of patients in attendance: 16    Referred by: Addictive Behavior Unit Treatment Team    Target symptoms: Substance Abuse    Patient's response to treatment: Active Listening     Progress toward goals: Progressing slowly    Interval History: Discussed the way differences in gender, fx of origin background, age, and ethnicity can impair the communication process when not taken into account.     Diagnosis: synthetic cannabinoid abuse    Plan: Continue treatment on ABU

## 2019-07-23 NOTE — PROGRESS NOTES
Group Psychotherapy (PhD/LCSW)    Site: [facility]    Clinical status of patient: Intensive Outpatient Program (IOP)    Date: 7/23/2019    Group Focus: ACT Group Psychotherapy    Length of service: 37137 - 45-50 minutes    Number of patients in attendance: 19    Referred by: Addictive Behavior Unit Treatment Team    Target symptoms: Substance Abuse    Patient's response to treatment: Active Listening    Progress toward goals: Progressing adequately    Interval History: Session focus was Avoidance and Acceptance.  Patients were introduced to the costs of avoidance, values of acceptance, and willingness (using the head exercise).  He was meeting with the psychiatrist for part of the session.    Diagnosis: Synthetic cannabinoid abuse    Plan: Continue treatment on ABU
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
## 2019-07-23 NOTE — PLAN OF CARE
07/23/19 1400   Activity/Group Therapy Checklist   Group Goals/Reflection   Attendance Attended   Follows Direction Followed directions   Group Interactions/Observations Interacted appropriately   Affect/Mood Range Normal range   Affect/Mood Display Appropriate   Goal Progression Progressing

## 2019-07-24 ENCOUNTER — HOSPITAL ENCOUNTER (OUTPATIENT)
Dept: PSYCHIATRY | Facility: HOSPITAL | Age: 24
Discharge: HOME | End: 2019-07-24
Attending: PSYCHIATRY & NEUROLOGY
Payer: COMMERCIAL

## 2019-07-24 VITALS — DIASTOLIC BLOOD PRESSURE: 78 MMHG | RESPIRATION RATE: 16 BRPM | SYSTOLIC BLOOD PRESSURE: 144 MMHG | HEART RATE: 82 BPM

## 2019-07-24 DIAGNOSIS — F12.10 CANNABIS ABUSE: ICD-10-CM

## 2019-07-24 DIAGNOSIS — F19.10 SYNTHETIC CANNABINOID ABUSE: Primary | ICD-10-CM

## 2019-07-24 LAB
AMPHET+METHAMPHET UR QL: NEGATIVE
BARBITURATES UR QL SCN>200 NG/ML: NEGATIVE
BENZODIAZ UR QL SCN>200 NG/ML: NEGATIVE
BREATH ALCOHOL: 0
BZE UR QL SCN: NEGATIVE
CANNABINOIDS UR QL SCN: NEGATIVE
CREAT UR-MCNC: 119 MG/DL (ref 23–375)
ETHANOL UR-MCNC: <10 MG/DL
METHADONE UR QL SCN>300 NG/ML: NEGATIVE
OPIATES UR QL SCN: NEGATIVE
PCP UR QL SCN>25 NG/ML: NEGATIVE
TOXICOLOGY INFORMATION: NORMAL

## 2019-07-24 PROCEDURE — 90853 GROUP PSYCHOTHERAPY: CPT | Mod: ,,, | Performed by: PSYCHOLOGIST

## 2019-07-24 PROCEDURE — 90853 GROUP PSYCHOTHERAPY: CPT

## 2019-07-24 PROCEDURE — 90853 PR GROUP PSYCHOTHERAPY: ICD-10-PCS | Mod: ,,, | Performed by: PSYCHOLOGIST

## 2019-07-24 PROCEDURE — 90853 GROUP PSYCHOTHERAPY: CPT | Performed by: SOCIAL WORKER

## 2019-07-24 PROCEDURE — 80307 DRUG TEST PRSMV CHEM ANLYZR: CPT

## 2019-07-24 NOTE — PROGRESS NOTES
Group Psychotherapy (PhD/LCSW)    Site: [facility]    Clinical status of patient: Intensive Outpatient Program (IOP)    Date: 7/24/2019    Group Focus: DBT-Based Group Psychotherapy    Length of service: 82077 - 45-50 minutes    Number of patients in attendance: 20    Referred by: Addictive Behavior Unit Treatment Team    Target symptoms: Substance Abuse    Patient's response to treatment: Active Listening    Progress toward goals: Progressing adequately    Interval History: Session focus was Acceptance.  Patients were provided with a rationale for radical acceptance and steps to practice accepting reality all the way.       Diagnosis: Synthetic cannabinoid abuse    Plan: Continue treatment on [facility]BU

## 2019-07-24 NOTE — PROGRESS NOTES
Group Psychotherapy (PhD/LCSW)    Site: [facility]    Clinical status of patient: Intensive Outpatient Program (IOP)    Date: 7/24/2019    Group Focus: Psychodynamic Group Psychotherapy    Length of service: 61686 - 45-50 minutes    Number of patients in attendance: 11    Referred by: Addictive Behavior Unit Treatment Team    Target symptoms: Substance Abuse    Patient's response to treatment: Active Listening and Self-disclosure    Progress toward goals: Progressing slowly    Interval History: Discussed looking forward to DC on Friday.    Diagnosis: synthetic cannabinoid abuse    Plan: Continue treatment on ABU

## 2019-07-24 NOTE — PROGRESS NOTES
Group Psychotherapy (PhD/LCSW)    Site: [facility]    Clinical status of patient: Intensive Outpatient Program (IOP)    Date: 7/24/2019    Group Focus: The Dynamics of Relationship       Length of service: 61485 - 45-50 minutes    Number of patients in attendance: 20    Referred by: Addictive Behavior Unit Treatment Team    Target symptoms: Substance Abuse    Patient's response to treatment: Active Listening      Progress toward goals: Progressing slowly    Interval History: Discussed strategies for repairing trust in relationships when there has been a significant breach of trust. Emphasized the importance of empathy. Demonstrated the value of using I-messages and Reflective Listening when interacting around trust issues.     Diagnosis: synthetic cannabinoid abuse    Plan: Continue treatment on ABU

## 2019-07-24 NOTE — PATIENT CARE CONFERENCE
ABU Staffing:    Cannabis Use Disorder Severe  Hallucinogen Use Disorder moderate-severe  Alcohol Use Disorder Moderate  Stimulant Use Disorder Moderate  Substance Induced Mood D/O, r/o bipolar d/o        1. Pt is attending all groups    2. Pt is attending all meetings  3. Pt 's has minimally supportive family  4. Pt has completed spiritual assessment    5. Pt will present life story    6. Pt will present Step One assignment    7. Pt is exploring issues related to relapse  prevention; spirituality; stress management; improved communication skills; assertiveness training; poor self-esteem; disease concepts; cross addictions; and, work related issues    8. D/C date: 7/26/19     Staff discussed pt's pre contemplative behavior. Staffing discussed pt  As a high risk for relapse, unable to identify as an addict, poor insight, and narcissistic behavior. Staffing discussed pt as superficially engaged, as well as oppositional and passive aggressive behavior. Staffing also discussed d/c pt tomorrow.       Problem: Cannabis Use Disorder Severe  Goal: Address in 12 step meetings and group and individual sessions    Objective Measure: participation in groups, self report, length of sobriety, and relapse prevention plan  Time: Prior to discharge    Progress: Pt is attending groups and sessions       Problem: Hallucinogen Use Disorder moderate-severe  Goal: Address in 12 step meetings and group and individual sessions    Objective Measure: participation in groups, self report, length of sobriety, and relapse prevention plan  Time: Prior to discharge    Progress: Pt is attending groups and sessions       Problem: Alcohol Use Disorder Moderate  Goal: Address in 12 step meetings and group and individual sessions    Objective Measure: participation in groups, self report, length of sobriety, and relapse prevention plan  Time: Prior to discharge    Progress: Pt is attending groups and sessions       Problem: Stimulant Use Disorder  Moderate  Goal: Address in 12 step meetings and group and individual sessions    Objective Measure: participation in groups, self report, length of sobriety, and relapse prevention plan  Time: Prior to discharge    Progress: Pt is attending groups and sessions       Problem: Substance Induced Mood D/O, r/o bipolar d/o  Goal: Address in 12 step meetings and group and individual sessions    Objective Measure: participation in groups, self report, length of sobriety, and relapse prevention plan  Time: Prior to discharge    Progress: Pt is attending groups and sessions          Staff members present:   MD Dr. Michele Cowan, Resident  Dr. Delgadillo, Ph.D  Muriel Mcgowan, [facility]  Hai Montoya, [facility]  Tobi Murphy, [facility]  Oumou Kennedy RN

## 2019-07-24 NOTE — PLAN OF CARE
07/24/19 1400   Activity/Group Therapy Checklist   Group Goals/Reflection   Attendance Attended   Follows Direction Followed directions   Group Interactions/Observations Interacted appropriately   Affect/Mood Range Normal range   Affect/Mood Display Appropriate   Goal Progression Progressing

## 2019-07-25 ENCOUNTER — HOSPITAL ENCOUNTER (OUTPATIENT)
Dept: PSYCHIATRY | Facility: HOSPITAL | Age: 24
Discharge: HOME | End: 2019-07-25
Attending: PSYCHIATRY & NEUROLOGY
Payer: COMMERCIAL

## 2019-07-25 ENCOUNTER — DOCUMENTATION ONLY (OUTPATIENT)
Dept: PSYCHIATRY | Facility: HOSPITAL | Age: 24
End: 2019-07-25

## 2019-07-25 DIAGNOSIS — F12.10 CANNABIS ABUSE: ICD-10-CM

## 2019-07-25 DIAGNOSIS — F19.10 SYNTHETIC CANNABINOID ABUSE: Primary | ICD-10-CM

## 2019-07-25 LAB
BREATH ALCOHOL: 0
ETHYL GLUCURONIDE: NEGATIVE
ETHYL GLUCURONIDE: NEGATIVE

## 2019-07-25 PROCEDURE — 90853 PR GROUP PSYCHOTHERAPY: ICD-10-PCS | Mod: ,,, | Performed by: PSYCHOLOGIST

## 2019-07-25 PROCEDURE — 90853 GROUP PSYCHOTHERAPY: CPT

## 2019-07-25 PROCEDURE — 90853 GROUP PSYCHOTHERAPY: CPT | Performed by: SOCIAL WORKER

## 2019-07-25 PROCEDURE — 90853 GROUP PSYCHOTHERAPY: CPT | Mod: ,,, | Performed by: PSYCHOLOGIST

## 2019-07-25 NOTE — PSYCH
Pt mother returned call reporting that pt will attend ABU today and make adjustments to family vacation. Mother added that the family will reassess whether pt will attend [facility]BU tomorrow.

## 2019-07-25 NOTE — PLAN OF CARE
07/25/19 1000   Activity/Group Therapy Checklist   Group Addiction Education  (honesty )   Attendance Attended   Follows Direction Followed directions   Group Interactions/Observations Interacted appropriately   Affect/Mood Range Normal range   Affect/Mood Display Appropriate

## 2019-07-25 NOTE — PSYCH
"Pt called reporting that he will be absent from program today and tomorrow 7/24 and 7/25 in lieu of planned D/C date. Pt reported, "I'm going on a vacation". SW discussed program expectations and completion of program in entirety. Sw discussed making up days on return. Pt questioned necessity behind completing final days. Pt mother picked up phone and reported that she would talk to spouse about whether pt would make up days in program. Pt reported that he would return call.   "

## 2019-07-25 NOTE — PLAN OF CARE
07/25/19 1400   Activity/Group Therapy Checklist   Group Relapse Prevention  (Step Work)   Attendance Attended   Follows Direction Followed directions   Group Interactions/Observations Interacted appropriately;Drowsy;Poor Concentration   Affect/Mood Range Normal range

## 2019-07-26 ENCOUNTER — HOSPITAL ENCOUNTER (OUTPATIENT)
Dept: PSYCHIATRY | Facility: HOSPITAL | Age: 24
Discharge: HOME | End: 2019-07-26
Attending: PSYCHIATRY & NEUROLOGY
Payer: COMMERCIAL

## 2019-07-26 VITALS — SYSTOLIC BLOOD PRESSURE: 136 MMHG | HEART RATE: 86 BPM | DIASTOLIC BLOOD PRESSURE: 78 MMHG | RESPIRATION RATE: 16 BRPM

## 2019-07-26 DIAGNOSIS — F12.10 CANNABIS ABUSE: ICD-10-CM

## 2019-07-26 DIAGNOSIS — F19.10 SYNTHETIC CANNABINOID ABUSE: Primary | ICD-10-CM

## 2019-07-26 LAB — BREATH ALCOHOL: 0

## 2019-07-26 PROCEDURE — 90853 GROUP PSYCHOTHERAPY: CPT | Mod: ,,, | Performed by: PSYCHOLOGIST

## 2019-07-26 PROCEDURE — 90853 GROUP PSYCHOTHERAPY: CPT | Performed by: SOCIAL WORKER

## 2019-07-26 PROCEDURE — 90853 PR GROUP PSYCHOTHERAPY: ICD-10-PCS | Mod: ,,, | Performed by: PSYCHOLOGIST

## 2019-07-26 PROCEDURE — 99239 HOSP IP/OBS DSCHRG MGMT >30: CPT | Mod: ,,, | Performed by: PSYCHIATRY & NEUROLOGY

## 2019-07-26 PROCEDURE — 90853 GROUP PSYCHOTHERAPY: CPT

## 2019-07-26 PROCEDURE — 99239 PR HOSPITAL DISCHARGE DAY,>30 MIN: ICD-10-PCS | Mod: ,,, | Performed by: PSYCHIATRY & NEUROLOGY

## 2019-07-26 NOTE — DISCHARGE SUMMARY
Name: Rosendo Riojas Jr.  : 1995  MRN: [id number]  DOA: 2019  DOD: 2019  Attending physician: Rubin Sainz MD  Resident: Sandra Bautista MD    [facility]  Discharge Summary  35 mins     Diagnoses:  Cannabis Use Disorder, severe  Hallucinogen Use Disorder, moderate-severe  Alcohol Use Disorder, Moderate  Stimulant Use Disorder, Moderate  Substance-Induced Mood Disorder    Patient Active Problem List   Diagnosis    Synthetic cannabinoid abuse    Cannabis abuse    Cannabis use disorder, severe, dependence    Alcohol use disorder, moderate, dependence    Stimulant use disorder    Substance induced mood disorder       Program course/treatments:  Patient started IOP in the [facility] on 2019. Patient consistently denying craving, plus no medications available with evidence base for treatment of cannabis use d/o, stimulant use d/o, or hallucinogen use d/o, therefore patient was not placed on any medications. Risperdal was held given no evidence of psychotic or manic symptoms with abstinence from substances. Patient was breathalyzed and gave a urine toxicology daily. Patient attended AA meetings daily during his time in the program and was able to get a sponsor, though rarely found the need to use them. His THC levels remained high for a while before finally coming down and his UDS was negative for the past 10 days of the program. He completed the program without relapse and in good standing. He will be attending weekly aftercare with Dr. Delgadillo. He was highly encouraged to continue to attend AA meetings and remain in contact with sponsor.    Labs:  Recent Results (from the past 840 hour(s))   THC , urine, confirmation    Collection Time: 19  2:44 PM   Result Value Ref Range    THC Conf Chain of Custody Test Not Performed     THC GC/MS Conf 50 Cutoff: 3.0 ng/mL    THC Conf. Interp. Positive.    Toxicology screen, urine    Collection Time: 19  11:48 AM   Result Value Ref Range    Alcohol, Urine <10 <10 mg/dL    Benzodiazepines Negative     Methadone metabolites Negative     Cocaine (Metab.) Negative     Opiate Scrn, Ur Negative     Barbiturate Screen, Ur Negative     Amphetamine Screen, Ur Negative     THC Presumptive Positive     Phencyclidine Negative     Creatinine, Random Ur 129.0 23.0 - 375.0 mg/dL    Toxicology Information SEE COMMENT    Alcohol Biomarkers, urine    Collection Time: 06/24/19 11:49 AM   Result Value Ref Range    Ethyl Glucuronide NEGATIVE    POCT BREATH ALCOHOL TEST    Collection Time: 06/24/19 11:50 AM   Result Value Ref Range    Breath Alcohol 0.000    THC , urine, confirmation    Collection Time: 06/24/19  5:39 PM   Result Value Ref Range    THC Conf Chain of Custody Test Not Performed     THC GC/MS Conf Test Not Performed     THC Conf. Interp. Test Not Performed    Toxicology screen, urine    Collection Time: 06/26/19 10:16 AM   Result Value Ref Range    Alcohol, Urine <10 <10 mg/dL    Benzodiazepines Negative     Methadone metabolites Negative     Cocaine (Metab.) Negative     Opiate Scrn, Ur Negative     Barbiturate Screen, Ur Negative     Amphetamine Screen, Ur Negative     THC Presumptive Positive     Phencyclidine Negative     Creatinine, Random Ur 235.0 23.0 - 375.0 mg/dL    Toxicology Information SEE COMMENT    POCT BREATH ALCOHOL TEST    Collection Time: 06/26/19 10:17 AM   Result Value Ref Range    Breath Alcohol 0.000    POCT BREATH ALCOHOL TEST    Collection Time: 06/27/19 10:38 AM   Result Value Ref Range    Breath Alcohol 0.000    THC , urine, confirmation    Collection Time: 06/27/19 12:03 PM   Result Value Ref Range    THC Conf Chain of Custody Test Not Performed     THC GC/MS Conf 68 Cutoff: 3.0 ng/mL    THC Conf. Interp. Positive.    Toxicology screen, urine    Collection Time: 06/28/19 11:53 AM   Result Value Ref Range    Alcohol, Urine <10 <10 mg/dL    Benzodiazepines Negative     Methadone metabolites Negative      Cocaine (Metab.) Negative     Opiate Scrn, Ur Negative     Barbiturate Screen, Ur Negative     Amphetamine Screen, Ur Negative     THC Presumptive Positive     Phencyclidine Negative     Creatinine, Random Ur 137.0 23.0 - 375.0 mg/dL    Toxicology Information SEE COMMENT    POCT BREATH ALCOHOL TEST    Collection Time: 06/28/19 11:54 AM   Result Value Ref Range    Breath Alcohol 0.000    THC , urine, confirmation    Collection Time: 06/28/19  4:17 PM   Result Value Ref Range    THC Conf Chain of Custody Test Not Performed     THC GC/MS Conf 34 Cutoff: 3.0 ng/mL    THC Conf. Interp. Positive.    Toxicology screen, urine    Collection Time: 07/01/19 10:46 AM   Result Value Ref Range    Alcohol, Urine <10 <10 mg/dL    Benzodiazepines Negative     Methadone metabolites Negative     Cocaine (Metab.) Negative     Opiate Scrn, Ur Negative     Barbiturate Screen, Ur Negative     Amphetamine Screen, Ur Negative     THC Presumptive Positive     Phencyclidine Negative     Creatinine, Random Ur 198.0 23.0 - 375.0 mg/dL    Toxicology Information SEE COMMENT    Alcohol Biomarkers, urine    Collection Time: 07/01/19 10:46 AM   Result Value Ref Range    Ethyl Glucuronide NEGATIVE    POCT BREATH ALCOHOL TEST    Collection Time: 07/01/19 10:48 AM   Result Value Ref Range    Breath Alcohol 0.000    THC , urine, confirmation    Collection Time: 07/01/19  5:09 PM   Result Value Ref Range    THC Conf Chain of Custody Test Not Performed     THC GC/MS Conf 33 Cutoff: 3.0 ng/mL    THC Conf. Interp. Positive.    POCT BREATH ALCOHOL TEST    Collection Time: 07/02/19 10:24 AM   Result Value Ref Range    Breath Alcohol 0.000    Toxicology screen, urine    Collection Time: 07/03/19 10:20 AM   Result Value Ref Range    Alcohol, Urine <10 <10 mg/dL    Benzodiazepines Negative     Methadone metabolites Negative     Cocaine (Metab.) Negative     Opiate Scrn, Ur Negative     Barbiturate Screen, Ur Negative     Amphetamine Screen, Ur Negative      THC Presumptive Positive     Phencyclidine Negative     Creatinine, Random Ur 104.0 23.0 - 375.0 mg/dL    Toxicology Information SEE COMMENT    POCT BREATH ALCOHOL TEST    Collection Time: 07/03/19 10:20 AM   Result Value Ref Range    Breath Alcohol 0.000    THC , urine, confirmation    Collection Time: 07/05/19 11:08 AM   Result Value Ref Range    THC Conf Chain of Custody Test Not Performed     THC GC/MS Conf 310 Cutoff: 3.0 ng/mL    THC Conf. Interp. Positive.    Toxicology screen, urine    Collection Time: 07/08/19 10:33 AM   Result Value Ref Range    Alcohol, Urine <10 <10 mg/dL    Benzodiazepines Negative     Methadone metabolites Negative     Cocaine (Metab.) Negative     Opiate Scrn, Ur Negative     Barbiturate Screen, Ur Negative     Amphetamine Screen, Ur Negative     THC Negative     Phencyclidine Negative     Creatinine, Random Ur 108.0 23.0 - 375.0 mg/dL    Toxicology Information SEE COMMENT    Alcohol Biomarkers, urine    Collection Time: 07/08/19 10:33 AM   Result Value Ref Range    Ethyl Glucuronide NEGATIVE    POCT BREATH ALCOHOL TEST    Collection Time: 07/09/19 11:56 AM   Result Value Ref Range    Breath Alcohol 0.000    POCT BREATH ALCOHOL TEST    Collection Time: 07/11/19 11:43 AM   Result Value Ref Range    Breath Alcohol 0.000    Toxicology screen, urine    Collection Time: 07/11/19 11:43 AM   Result Value Ref Range    Alcohol, Urine <10 <10 mg/dL    Benzodiazepines Negative     Methadone metabolites Negative     Cocaine (Metab.) Negative     Opiate Scrn, Ur Negative     Barbiturate Screen, Ur Negative     Amphetamine Screen, Ur Negative     THC Presumptive Positive     Phencyclidine Negative     Creatinine, Random Ur 166.0 23.0 - 375.0 mg/dL    Toxicology Information SEE COMMENT    Alcohol Biomarkers, urine    Collection Time: 07/11/19 11:43 AM   Result Value Ref Range    Ethyl Glucuronide Test Not Performed    THC , urine, confirmation    Collection Time: 07/11/19  4:02 PM   Result Value Ref  Range    THC Conf Chain of Custody Test Not Performed     THC GC/MS Conf 21 Cutoff: 3.0 ng/mL    THC Conf. Interp. Positive.    Toxicology screen, urine    Collection Time: 07/12/19 10:59 AM   Result Value Ref Range    Alcohol, Urine <10 <10 mg/dL    Benzodiazepines Negative     Methadone metabolites Negative     Cocaine (Metab.) Negative     Opiate Scrn, Ur Negative     Barbiturate Screen, Ur Negative     Amphetamine Screen, Ur Negative     THC Presumptive Positive     Phencyclidine Negative     Creatinine, Random Ur 193.0 23.0 - 375.0 mg/dL    Toxicology Information SEE COMMENT    POCT BREATH ALCOHOL TEST    Collection Time: 07/12/19 11:01 AM   Result Value Ref Range    Breath Alcohol 0.000    THC , urine, confirmation    Collection Time: 07/12/19  1:34 PM   Result Value Ref Range    THC Conf Chain of Custody Test Not Performed     THC GC/MS Conf 19 Cutoff: 3.0 ng/mL    THC Conf. Interp. Positive.    Toxicology screen, urine    Collection Time: 07/15/19 11:12 AM   Result Value Ref Range    Alcohol, Urine <10 <10 mg/dL    Benzodiazepines Negative     Methadone metabolites Negative     Cocaine (Metab.) Negative     Opiate Scrn, Ur Negative     Barbiturate Screen, Ur Negative     Amphetamine Screen, Ur Negative     THC Presumptive Positive     Phencyclidine Negative     Creatinine, Random Ur 269.0 23.0 - 375.0 mg/dL    Toxicology Information SEE COMMENT    Alcohol Biomarkers, urine    Collection Time: 07/15/19 11:12 AM   Result Value Ref Range    Ethyl Glucuronide Negative    POCT BREATH ALCOHOL TEST    Collection Time: 07/15/19 11:13 AM   Result Value Ref Range    Breath Alcohol 0.000    THC , urine, confirmation    Collection Time: 07/15/19  3:02 PM   Result Value Ref Range    THC Conf Chain of Custody Test Not Performed     THC GC/MS Conf 22 Cutoff: 3.0 ng/mL    THC Conf. Interp. Positive.    POCT BREATH ALCOHOL TEST    Collection Time: 07/16/19 11:38 AM   Result Value Ref Range    Breath Alcohol 0.000    POCT  BREATH ALCOHOL TEST    Collection Time: 07/16/19 11:41 AM   Result Value Ref Range    Breath Alcohol 0.000    Toxicology screen, urine    Collection Time: 07/17/19 11:19 AM   Result Value Ref Range    Alcohol, Urine <10 <10 mg/dL    Benzodiazepines Negative     Methadone metabolites Negative     Cocaine (Metab.) Negative     Opiate Scrn, Ur Negative     Barbiturate Screen, Ur Negative     Amphetamine Screen, Ur Negative     THC Negative     Phencyclidine Negative     Creatinine, Random Ur 211.0 23.0 - 375.0 mg/dL    Toxicology Information SEE COMMENT    POCT BREATH ALCOHOL TEST    Collection Time: 07/17/19 11:21 AM   Result Value Ref Range    Breath Alcohol 0.000    POCT BREATH ALCOHOL TEST    Collection Time: 07/18/19 10:49 AM   Result Value Ref Range    Breath Alcohol 0.000    Toxicology screen, urine    Collection Time: 07/19/19 10:37 AM   Result Value Ref Range    Alcohol, Urine <10 <10 mg/dL    Benzodiazepines Negative     Methadone metabolites Negative     Cocaine (Metab.) Negative     Opiate Scrn, Ur Negative     Barbiturate Screen, Ur Negative     Amphetamine Screen, Ur Negative     THC Negative     Phencyclidine Negative     Creatinine, Random Ur 168.0 23.0 - 375.0 mg/dL    Toxicology Information SEE COMMENT    POCT BREATH ALCOHOL TEST    Collection Time: 07/19/19 10:40 AM   Result Value Ref Range    Breath Alcohol 0.000    Toxicology screen, urine    Collection Time: 07/22/19 11:35 AM   Result Value Ref Range    Alcohol, Urine <10 <10 mg/dL    Benzodiazepines Negative     Methadone metabolites Negative     Cocaine (Metab.) Negative     Opiate Scrn, Ur Negative     Barbiturate Screen, Ur Negative     Amphetamine Screen, Ur Negative     THC Negative     Phencyclidine Negative     Creatinine, Random Ur 133.0 23.0 - 375.0 mg/dL    Toxicology Information SEE COMMENT    Alcohol Biomarkers, urine    Collection Time: 07/22/19 11:35 AM   Result Value Ref Range    Ethyl Glucuronide Negative    POCT BREATH ALCOHOL  "TEST    Collection Time: 07/22/19 11:36 AM   Result Value Ref Range    Breath Alcohol 0.000    POCT BREATH ALCOHOL TEST    Collection Time: 07/23/19 11:34 AM   Result Value Ref Range    Breath Alcohol 0.000    Toxicology screen, urine    Collection Time: 07/24/19 11:28 AM   Result Value Ref Range    Alcohol, Urine <10 <10 mg/dL    Benzodiazepines Negative     Methadone metabolites Negative     Cocaine (Metab.) Negative     Opiate Scrn, Ur Negative     Barbiturate Screen, Ur Negative     Amphetamine Screen, Ur Negative     THC Negative     Phencyclidine Negative     Creatinine, Random Ur 119.0 23.0 - 375.0 mg/dL    Toxicology Information SEE COMMENT    POCT BREATH ALCOHOL TEST    Collection Time: 07/24/19 11:35 AM   Result Value Ref Range    Breath Alcohol 0.000    POCT BREATH ALCOHOL TEST    Collection Time: 07/25/19 11:48 AM   Result Value Ref Range    Breath Alcohol 0.000    POCT BREATH ALCOHOL TEST    Collection Time: 07/26/19 11:09 AM   Result Value Ref Range    Breath Alcohol 0.000        Vitals:  Vitals:    07/26/19 1108   BP: 136/78   BP Location: Right arm   Patient Position: Sitting   Pulse: 86   Resp: 16       Mental Status Exam:  Appearance: unremarkable, age appropriate, casually dressed  Level of Consciousness: Awake, alert  Behavior/Cooperation: normal, cooperative  Psychomotor: unremarkable   Speech: normal tone, normal rate, normal pitch, normal volume  Language: english, fluid  Orientation: grossly intact  Attention Span/Concentration: intact  Memory: Grossly intact  Mood: "good"  Affect: normal, calm  Thought Process: linear, normal and logical  Associations: normal and logical  Thought Content: normal, no suicidality, no homicidality, delusions, or paranoia  Fund of Knowledge: Aware of current events  Abstraction: Grossly intact  Insight: fair  Judgment: fair    Discharge Instructions:    Diet:  No restrictions    Activity:  activity as tolerated    Medications:  No current outpatient medications " on file prior to encounter.     No current facility-administered medications on file prior to encounter.      · Follow up with outpatient mental health provider as discussed with treatment team  · Avoid all drugs and alcohol     Sandra Bautista MD, PhD  [facility], PGY-2  Pager Number [phone]  [facility]      Staff note : I, Rubin Sainz MD have personally seen and evaluated the patient on 7-26-19 , and reviewed the above history and exam. I agree and concur with the current assessment and plan.

## 2019-07-26 NOTE — PLAN OF CARE
07/26/19 1400   Activity/Group Therapy Checklist   Group Activity  (perception of self )   Attendance Attended   Follows Direction Followed directions   Group Interactions/Observations Interacted appropriately   Affect/Mood Range Normal range   Affect/Mood Display Appropriate   Goal Progression Progressing

## 2019-07-26 NOTE — PROGRESS NOTES
Group Psychotherapy (PhD/LCSW)    Site: [facility]    Clinical status of patient: Intensive Outpatient Program (IOP)    Date: 7/26/2019    Group Focus: Stress Management    Length of service: 01212 - 45-50 minutes    Number of patients in attendance: 15    Referred by: [facility] Treatment Team    Target symptoms: Substance Abuse    Patient's response to treatment: Active Listening    Progress toward goals: Progressing adequately    Interval History: Group learned and practiced mindfulness techniques (walking meditation) to improve present-moment awareness, impulsive behavior tendencies, and tolerance of various emotional states.    Diagnosis: Synthetic Cannabinoid Abuse    Plan: Continue treatment on [facility]

## 2019-07-26 NOTE — PROGRESS NOTES
Group Psychotherapy (PhD/LCSW)    Site: [facility]    Clinical status of patient: Intensive Outpatient Program (IOP)    Date: 7/26/2019    Group Focus: Psychodynamic Group Psychotherapy    Length of service: 28837 - 45-50 minutes    Number of patients in attendance: 10    Referred by: [facility] Unit Treatment Team    Target symptoms: Substance Abuse    Patient's response to treatment: Active Listening and Self-disclosure    Progress toward goals: Progressing slowly    Interval History:  Pt discussed the way he has learned to have patience while on the [facility] program. Very happy that today is his last day.     Diagnosis: synthetic cannabinoid abuse    Plan: See Discharge Note dated 7/25/19

## 2019-07-26 NOTE — PLAN OF CARE
07/26/19 1300   Activity/Group Therapy Checklist   Group Relapse Prevention  (Step Work)   Attendance Attended   Follows Direction Followed directions   Group Interactions/Observations Drowsy;Poor Concentration;Fell Asleep   Affect/Mood Range Normal range   Affect/Mood Display Calm

## 2019-08-07 LAB — MAYO MISCELLANEOUS RESULT (REF): NORMAL

## 2019-08-21 LAB — ETHYL GLUCURONIDE: NEGATIVE

## 2022-01-06 ENCOUNTER — LAB VISIT (OUTPATIENT)
Dept: PRIMARY CARE CLINIC | Facility: OTHER | Age: 27
End: 2022-01-06
Attending: INTERNAL MEDICINE
Payer: COMMERCIAL

## 2022-01-06 DIAGNOSIS — Z20.822 ENCOUNTER FOR LABORATORY TESTING FOR COVID-19 VIRUS: ICD-10-CM

## 2022-01-06 PROCEDURE — U0003 INFECTIOUS AGENT DETECTION BY NUCLEIC ACID (DNA OR RNA); SEVERE ACUTE RESPIRATORY SYNDROME CORONAVIRUS 2 (SARS-COV-2) (CORONAVIRUS DISEASE [COVID-19]), AMPLIFIED PROBE TECHNIQUE, MAKING USE OF HIGH THROUGHPUT TECHNOLOGIES AS DESCRIBED BY CMS-2020-01-R: HCPCS | Mod: ST72 | Performed by: INTERNAL MEDICINE

## 2022-01-11 LAB
SARS-COV-2 RNA RESP QL NAA+PROBE: NORMAL
TEST PERFORMANCE INFO SPEC: NORMAL
